# Patient Record
Sex: FEMALE | Race: WHITE | NOT HISPANIC OR LATINO | Employment: OTHER | URBAN - METROPOLITAN AREA
[De-identification: names, ages, dates, MRNs, and addresses within clinical notes are randomized per-mention and may not be internally consistent; named-entity substitution may affect disease eponyms.]

---

## 2019-02-13 ENCOUNTER — HOSPITAL ENCOUNTER (OUTPATIENT)
Dept: FAMILY MEDICINE CLINIC | Facility: CLINIC | Age: 84
Discharge: HOME | End: 2019-02-13
Attending: FAMILY MEDICINE

## 2019-02-15 LAB
AMOXICILLIN+CLAV SUSC ISLT: 16
AMPICILLIN SUSC ISLT: >=32
AMPICILLIN+SULBAC SUSC ISLT: >=32
BACTERIA UR CULT: ABNORMAL
CEFAZOLIN SUSC ISLT: <=4
CEFEPIME SUSC ISLT: <=1
CEFTAZIDIME SUSC ISLT: <=1
CEFTRIAXONE SUSC ISLT: <=1
CEFUROXIME ORAL SUSC ISLT: 16
CEFUROXIME PARENTER SUSC ISLT: 16
CIPROFLOXACIN SUSC ISLT: >=4
ERTAPENEM SUSC ISLT: <=0.5
GENTAMICIN SUSC ISLT: <=1
LEVOFLOXACIN SUSC ISLT: >=8
NITROFURANTOIN SUSC ISLT: <=16
TETRACYCLINE SUSC ISLT: 2
TMP SMX SUSC ISLT: <=20
TOBRAMYCIN SUSC ISLT: <=1

## 2019-03-19 ENCOUNTER — HOSPITAL ENCOUNTER (OUTPATIENT)
Dept: URGENT CARE | Facility: CLINIC | Age: 84
Discharge: HOME | End: 2019-03-19
Attending: NURSE PRACTITIONER

## 2019-03-27 ENCOUNTER — HOSPITAL ENCOUNTER (OUTPATIENT)
Dept: FAMILY MEDICINE CLINIC | Facility: CLINIC | Age: 84
Discharge: HOME | End: 2019-03-27
Attending: FAMILY MEDICINE

## 2019-03-27 LAB
ANION GAP SERPL CALC-SCNC: 20 MMOL/L (ref 8–19)
BUN SERPL-MCNC: 20 MG/DL (ref 5–25)
BUN/CREAT SERPL: 22 {RATIO} (ref 6–20)
CALCIUM SERPL-MCNC: 9.5 MG/DL (ref 8.7–10.4)
CHLORIDE SERPL-SCNC: 99 MMOL/L (ref 99–111)
CONV CO2: 23 MMOL/L (ref 22–32)
CREAT UR-MCNC: 0.91 MG/DL (ref 0.5–0.9)
GFR SERPLBLD BASED ON 1.73 SQ M-ARVRAT: 58 ML/MIN/{1.73_M2}
GLUCOSE SERPL-MCNC: 124 MG/DL (ref 65–99)
OSMOLALITY SERPL CALC.SUM OF ELEC: 290 MOSM/KG (ref 273–304)
POTASSIUM SERPL-SCNC: 4.1 MMOL/L (ref 3.5–5.3)
SODIUM SERPL-SCNC: 138 MMOL/L (ref 135–147)
TSH SERPL-ACNC: 0.3 M[IU]/L (ref 0.27–4.2)

## 2019-04-10 ENCOUNTER — HOSPITAL ENCOUNTER (OUTPATIENT)
Dept: FAMILY MEDICINE CLINIC | Facility: CLINIC | Age: 84
Discharge: HOME | End: 2019-04-10
Attending: FAMILY MEDICINE

## 2019-04-10 LAB
BASOPHILS # BLD AUTO: 0.04 10*3/UL (ref 0–0.2)
BASOPHILS NFR BLD AUTO: 0.6 % (ref 0–3)
CONV ABS IMM GRAN: 0.02 10*3/UL (ref 0–0.2)
CONV IMMATURE GRAN: 0.3 % (ref 0–1.8)
DEPRECATED RDW RBC AUTO: 45.3 FL (ref 36.4–46.3)
EOSINOPHIL # BLD AUTO: 0.12 10*3/UL (ref 0–0.7)
EOSINOPHIL # BLD AUTO: 1.9 % (ref 0–7)
ERYTHROCYTE [DISTWIDTH] IN BLOOD BY AUTOMATED COUNT: 13.4 % (ref 11.7–14.4)
HBA1C MFR BLD: 12.9 G/DL (ref 12–16)
HCT VFR BLD AUTO: 39.9 % (ref 37–47)
LYMPHOCYTES # BLD AUTO: 1.69 10*3/UL (ref 1–5)
MCH RBC QN AUTO: 29.9 PG (ref 27–31)
MCHC RBC AUTO-ENTMCNC: 32.3 G/DL (ref 33–37)
MCV RBC AUTO: 92.4 FL (ref 81–99)
MONOCYTES # BLD AUTO: 0.68 10*3/UL (ref 0.2–1.2)
MONOCYTES NFR BLD AUTO: 10.9 % (ref 3–10)
NEUTROPHILS # BLD AUTO: 3.69 10*3/UL (ref 2–8)
NEUTROPHILS NFR BLD AUTO: 59.2 % (ref 30–85)
NRBC CBCN: 0 % (ref 0–0.7)
PLATELET # BLD AUTO: 198 10*3/UL (ref 130–400)
PMV BLD AUTO: 12 FL (ref 9.4–12.3)
RBC # BLD AUTO: 4.32 10*6/UL (ref 4.2–5.4)
VARIANT LYMPHS NFR BLD MANUAL: 27.1 % (ref 20–45)
WBC # BLD AUTO: 6.24 10*3/UL (ref 4.8–10.8)

## 2020-06-02 LAB — SARS-COV-2 RNA SPEC QL NAA+PROBE: NOT DETECTED

## 2020-06-04 ENCOUNTER — HOSPITAL ENCOUNTER (OUTPATIENT)
Dept: PERIOP | Facility: HOSPITAL | Age: 85
Setting detail: HOSPITAL OUTPATIENT SURGERY
Discharge: HOME | End: 2020-06-04
Attending: OPHTHALMOLOGY

## 2020-06-09 ENCOUNTER — HOSPITAL ENCOUNTER (OUTPATIENT)
Dept: FAMILY MEDICINE CLINIC | Facility: CLINIC | Age: 85
Discharge: HOME | End: 2020-06-09
Attending: FAMILY MEDICINE

## 2020-06-09 LAB
ALBUMIN SERPL-MCNC: 4.1 G/DL (ref 3.5–5)
ALBUMIN/GLOB SERPL: 1.1 {RATIO} (ref 1.4–2.6)
ALP SERPL-CCNC: 119 U/L (ref 43–160)
ALT SERPL-CCNC: 37 U/L (ref 10–40)
ANION GAP SERPL CALC-SCNC: 18 MMOL/L (ref 8–19)
AST SERPL-CCNC: 36 U/L (ref 15–50)
BILIRUB SERPL-MCNC: 0.39 MG/DL (ref 0.2–1.3)
BUN SERPL-MCNC: 19 MG/DL (ref 5–25)
BUN/CREAT SERPL: 18 {RATIO} (ref 6–20)
CALCIUM SERPL-MCNC: 9.6 MG/DL (ref 8.7–10.4)
CHLORIDE SERPL-SCNC: 103 MMOL/L (ref 99–111)
CHOLEST SERPL-MCNC: 158 MG/DL (ref 107–200)
CHOLEST/HDLC SERPL: 4.1 {RATIO} (ref 3–6)
CONV CO2: 21 MMOL/L (ref 22–32)
CONV TOTAL PROTEIN: 7.7 G/DL (ref 6.3–8.2)
CREAT UR-MCNC: 1.06 MG/DL (ref 0.5–0.9)
GFR SERPLBLD BASED ON 1.73 SQ M-ARVRAT: 48 ML/MIN/{1.73_M2}
GLOBULIN UR ELPH-MCNC: 3.6 G/DL (ref 2–3.5)
GLUCOSE SERPL-MCNC: 94 MG/DL (ref 65–99)
HDLC SERPL-MCNC: 39 MG/DL (ref 40–60)
LDLC SERPL CALC-MCNC: 101 MG/DL (ref 70–100)
OSMOLALITY SERPL CALC.SUM OF ELEC: 288 MOSM/KG (ref 273–304)
POTASSIUM SERPL-SCNC: 4.4 MMOL/L (ref 3.5–5.3)
SODIUM SERPL-SCNC: 138 MMOL/L (ref 135–147)
TRIGL SERPL-MCNC: 92 MG/DL (ref 40–150)
TSH SERPL-ACNC: 2.3 M[IU]/L (ref 0.27–4.2)
VLDLC SERPL-MCNC: 18 MG/DL (ref 5–37)

## 2022-07-26 ENCOUNTER — TELEPHONE (OUTPATIENT)
Dept: ORTHOPEDIC SURGERY | Facility: CLINIC | Age: 87
End: 2022-07-26

## 2022-11-18 ENCOUNTER — TRANSCRIBE ORDERS (OUTPATIENT)
Dept: LAB | Facility: HOSPITAL | Age: 87
End: 2022-11-18

## 2022-11-18 ENCOUNTER — LAB (OUTPATIENT)
Dept: LAB | Facility: HOSPITAL | Age: 87
End: 2022-11-18

## 2022-11-18 DIAGNOSIS — B17.9 ACUTE VIRAL HEPATITIS, UNSPECIFIED VIRAL HEPATITIS TYPE: ICD-10-CM

## 2022-11-18 DIAGNOSIS — Z79.899 ENCOUNTER FOR LONG-TERM (CURRENT) USE OF OTHER MEDICATIONS: ICD-10-CM

## 2022-11-18 DIAGNOSIS — L30.9 ACUTE DERMATITIS: ICD-10-CM

## 2022-11-18 DIAGNOSIS — B17.9 ACUTE VIRAL HEPATITIS, UNSPECIFIED VIRAL HEPATITIS TYPE: Primary | ICD-10-CM

## 2022-11-18 LAB
BASOPHILS # BLD AUTO: 0.04 10*3/MM3 (ref 0–0.2)
BASOPHILS NFR BLD AUTO: 0.5 % (ref 0–1.5)
DEPRECATED RDW RBC AUTO: 41.3 FL (ref 37–54)
EOSINOPHIL # BLD AUTO: 0.17 10*3/MM3 (ref 0–0.4)
EOSINOPHIL NFR BLD AUTO: 2.1 % (ref 0.3–6.2)
ERYTHROCYTE [DISTWIDTH] IN BLOOD BY AUTOMATED COUNT: 12.9 % (ref 12.3–15.4)
HAV IGM SERPL QL IA: NORMAL
HBV CORE IGM SERPL QL IA: NORMAL
HBV SURFACE AG SERPL QL IA: NORMAL
HCT VFR BLD AUTO: 37 % (ref 34–46.6)
HCV AB SER DONR QL: NORMAL
HGB BLD-MCNC: 12.2 G/DL (ref 12–15.9)
IMM GRANULOCYTES # BLD AUTO: 0.03 10*3/MM3 (ref 0–0.05)
IMM GRANULOCYTES NFR BLD AUTO: 0.4 % (ref 0–0.5)
LYMPHOCYTES # BLD AUTO: 1.08 10*3/MM3 (ref 0.7–3.1)
LYMPHOCYTES NFR BLD AUTO: 13.2 % (ref 19.6–45.3)
MCH RBC QN AUTO: 29 PG (ref 26.6–33)
MCHC RBC AUTO-ENTMCNC: 33 G/DL (ref 31.5–35.7)
MCV RBC AUTO: 88.1 FL (ref 79–97)
MONOCYTES # BLD AUTO: 1 10*3/MM3 (ref 0.1–0.9)
MONOCYTES NFR BLD AUTO: 12.2 % (ref 5–12)
NEUTROPHILS NFR BLD AUTO: 5.86 10*3/MM3 (ref 1.7–7)
NEUTROPHILS NFR BLD AUTO: 71.6 % (ref 42.7–76)
NRBC BLD AUTO-RTO: 0 /100 WBC (ref 0–0.2)
PLATELET # BLD AUTO: 265 10*3/MM3 (ref 140–450)
PMV BLD AUTO: 11.3 FL (ref 6–12)
RBC # BLD AUTO: 4.2 10*6/MM3 (ref 3.77–5.28)
WBC NRBC COR # BLD: 8.18 10*3/MM3 (ref 3.4–10.8)

## 2022-11-18 PROCEDURE — 85025 COMPLETE CBC W/AUTO DIFF WBC: CPT

## 2022-11-18 PROCEDURE — 36415 COLL VENOUS BLD VENIPUNCTURE: CPT

## 2022-11-18 PROCEDURE — 80053 COMPREHEN METABOLIC PANEL: CPT

## 2022-11-18 PROCEDURE — 80074 ACUTE HEPATITIS PANEL: CPT

## 2022-11-19 LAB
ALBUMIN SERPL-MCNC: 3.6 G/DL (ref 3.5–5.2)
ALBUMIN/GLOB SERPL: 1.1 G/DL
ALP SERPL-CCNC: 97 U/L (ref 39–117)
ALT SERPL W P-5'-P-CCNC: 11 U/L (ref 1–33)
ANION GAP SERPL CALCULATED.3IONS-SCNC: 12.3 MMOL/L (ref 5–15)
AST SERPL-CCNC: 21 U/L (ref 1–32)
BILIRUB SERPL-MCNC: 0.3 MG/DL (ref 0–1.2)
BUN SERPL-MCNC: 17 MG/DL (ref 8–23)
BUN/CREAT SERPL: 19.1 (ref 7–25)
CALCIUM SPEC-SCNC: 9.2 MG/DL (ref 8.6–10.5)
CHLORIDE SERPL-SCNC: 99 MMOL/L (ref 98–107)
CO2 SERPL-SCNC: 24.7 MMOL/L (ref 22–29)
CREAT SERPL-MCNC: 0.89 MG/DL (ref 0.57–1)
EGFRCR SERPLBLD CKD-EPI 2021: 62.8 ML/MIN/1.73
GLOBULIN UR ELPH-MCNC: 3.4 GM/DL
GLUCOSE SERPL-MCNC: 90 MG/DL (ref 65–99)
POTASSIUM SERPL-SCNC: 4.3 MMOL/L (ref 3.5–5.2)
PROT SERPL-MCNC: 7 G/DL (ref 6–8.5)
SODIUM SERPL-SCNC: 136 MMOL/L (ref 136–145)

## 2023-04-19 ENCOUNTER — OFFICE VISIT (OUTPATIENT)
Dept: FAMILY MEDICINE CLINIC | Facility: CLINIC | Age: 88
End: 2023-04-19
Payer: MEDICARE

## 2023-04-19 VITALS
SYSTOLIC BLOOD PRESSURE: 110 MMHG | OXYGEN SATURATION: 94 % | BODY MASS INDEX: 31.06 KG/M2 | HEIGHT: 62 IN | HEART RATE: 78 BPM | WEIGHT: 168.8 LBS | DIASTOLIC BLOOD PRESSURE: 64 MMHG

## 2023-04-19 DIAGNOSIS — I48.21 PERMANENT ATRIAL FIBRILLATION: Primary | ICD-10-CM

## 2023-04-19 DIAGNOSIS — E03.9 HYPOTHYROIDISM, UNSPECIFIED TYPE: ICD-10-CM

## 2023-04-19 DIAGNOSIS — L57.0 ACTINIC KERATOSIS: ICD-10-CM

## 2023-04-19 DIAGNOSIS — Z95.0 PACEMAKER: ICD-10-CM

## 2023-04-19 RX ORDER — RIVAROXABAN 15 MG/1
1 TABLET, FILM COATED ORAL DAILY
COMMUNITY
Start: 2023-01-03

## 2023-04-19 RX ORDER — CARVEDILOL 12.5 MG/1
1 TABLET ORAL EVERY 12 HOURS SCHEDULED
COMMUNITY
Start: 2023-04-05

## 2023-04-19 RX ORDER — DILTIAZEM HYDROCHLORIDE 180 MG/1
1 CAPSULE, COATED, EXTENDED RELEASE ORAL DAILY
COMMUNITY
Start: 2023-04-05

## 2023-04-19 RX ORDER — KETOCONAZOLE 20 MG/G
CREAM TOPICAL
COMMUNITY
Start: 2023-01-12

## 2023-04-19 RX ORDER — TRIAMCINOLONE ACETONIDE 1 MG/G
CREAM TOPICAL
COMMUNITY
Start: 2023-01-03

## 2023-04-19 RX ORDER — LEVOTHYROXINE SODIUM 175 UG/1
1 TABLET ORAL DAILY
COMMUNITY
Start: 2023-04-05

## 2023-07-09 PROBLEM — I50.9 CHF (CONGESTIVE HEART FAILURE): Status: ACTIVE | Noted: 2023-07-09

## 2023-08-11 ENCOUNTER — TELEPHONE (OUTPATIENT)
Dept: FAMILY MEDICINE CLINIC | Facility: CLINIC | Age: 88
End: 2023-08-11
Payer: MEDICARE

## 2023-08-18 ENCOUNTER — TELEPHONE (OUTPATIENT)
Dept: FAMILY MEDICINE CLINIC | Facility: CLINIC | Age: 88
End: 2023-08-18

## 2023-08-21 RX ORDER — LEVOTHYROXINE SODIUM 175 UG/1
175 TABLET ORAL DAILY
Qty: 30 TABLET | Refills: 1 | Status: SHIPPED | OUTPATIENT
Start: 2023-08-21 | End: 2023-08-23 | Stop reason: SDUPTHER

## 2023-08-23 ENCOUNTER — OFFICE VISIT (OUTPATIENT)
Dept: FAMILY MEDICINE CLINIC | Facility: CLINIC | Age: 88
End: 2023-08-23
Payer: MEDICARE

## 2023-08-23 VITALS
SYSTOLIC BLOOD PRESSURE: 117 MMHG | WEIGHT: 158.3 LBS | HEIGHT: 62 IN | OXYGEN SATURATION: 96 % | TEMPERATURE: 98 F | DIASTOLIC BLOOD PRESSURE: 62 MMHG | HEART RATE: 86 BPM | BODY MASS INDEX: 29.13 KG/M2

## 2023-08-23 DIAGNOSIS — E03.9 HYPOTHYROIDISM, UNSPECIFIED TYPE: Primary | ICD-10-CM

## 2023-08-23 DIAGNOSIS — R35.0 URINARY FREQUENCY: ICD-10-CM

## 2023-08-23 LAB
BILIRUB BLD-MCNC: NEGATIVE MG/DL
CLARITY, POC: CLEAR
COLOR UR: YELLOW
EXPIRATION DATE: ABNORMAL
GLUCOSE UR STRIP-MCNC: NEGATIVE MG/DL
KETONES UR QL: NEGATIVE
LEUKOCYTE EST, POC: ABNORMAL
Lab: ABNORMAL
NITRITE UR-MCNC: NEGATIVE MG/ML
PH UR: 5.5 [PH] (ref 5–8)
PROT UR STRIP-MCNC: NEGATIVE MG/DL
RBC # UR STRIP: NEGATIVE /UL
SP GR UR: 1.01 (ref 1–1.03)
UROBILINOGEN UR QL: NORMAL

## 2023-08-23 PROCEDURE — 87086 URINE CULTURE/COLONY COUNT: CPT | Performed by: NURSE PRACTITIONER

## 2023-08-23 RX ORDER — LEVOTHYROXINE SODIUM 175 UG/1
175 TABLET ORAL DAILY
Qty: 30 TABLET | Refills: 1 | Status: SHIPPED | OUTPATIENT
Start: 2023-08-23

## 2023-08-25 ENCOUNTER — TELEPHONE (OUTPATIENT)
Dept: FAMILY MEDICINE CLINIC | Facility: CLINIC | Age: 88
End: 2023-08-25
Payer: MEDICARE

## 2023-08-25 LAB — BACTERIA SPEC AEROBE CULT: ABNORMAL

## 2023-08-25 RX ORDER — CIPROFLOXACIN 500 MG/1
500 TABLET, FILM COATED ORAL 2 TIMES DAILY
Qty: 14 TABLET | Refills: 0 | Status: SHIPPED | OUTPATIENT
Start: 2023-08-25 | End: 2023-09-01

## 2023-10-12 ENCOUNTER — TELEPHONE (OUTPATIENT)
Dept: FAMILY MEDICINE CLINIC | Facility: CLINIC | Age: 88
End: 2023-10-12
Payer: MEDICARE

## 2023-10-13 DIAGNOSIS — R60.1 GENERALIZED EDEMA: ICD-10-CM

## 2023-10-13 RX ORDER — POTASSIUM CHLORIDE 750 MG/1
10 TABLET, EXTENDED RELEASE ORAL 2 TIMES DAILY
Qty: 180 TABLET | Refills: 1 | OUTPATIENT
Start: 2023-10-13

## 2023-11-16 RX ORDER — RIVAROXABAN 15 MG/1
15 TABLET, FILM COATED ORAL DAILY
Qty: 90 TABLET | Refills: 1 | Status: SHIPPED | OUTPATIENT
Start: 2023-11-16

## 2024-01-05 DIAGNOSIS — E03.9 HYPOTHYROIDISM, UNSPECIFIED TYPE: ICD-10-CM

## 2024-01-05 RX ORDER — CARVEDILOL 12.5 MG/1
12.5 TABLET ORAL EVERY 12 HOURS SCHEDULED
Qty: 30 TABLET | Refills: 1 | Status: SHIPPED | OUTPATIENT
Start: 2024-01-05

## 2024-01-05 RX ORDER — DILTIAZEM HYDROCHLORIDE 180 MG/1
180 CAPSULE, COATED, EXTENDED RELEASE ORAL DAILY
Qty: 30 CAPSULE | Refills: 1 | Status: SHIPPED | OUTPATIENT
Start: 2024-01-05

## 2024-01-05 RX ORDER — LEVOTHYROXINE SODIUM 175 UG/1
175 TABLET ORAL DAILY
Qty: 30 TABLET | Refills: 1 | Status: SHIPPED | OUTPATIENT
Start: 2024-01-05

## 2024-02-20 ENCOUNTER — OFFICE VISIT (OUTPATIENT)
Dept: FAMILY MEDICINE CLINIC | Facility: CLINIC | Age: 89
End: 2024-02-20
Payer: MEDICARE

## 2024-02-20 VITALS
WEIGHT: 158.4 LBS | DIASTOLIC BLOOD PRESSURE: 62 MMHG | OXYGEN SATURATION: 96 % | HEART RATE: 76 BPM | HEIGHT: 62 IN | TEMPERATURE: 97.8 F | SYSTOLIC BLOOD PRESSURE: 111 MMHG | BODY MASS INDEX: 29.15 KG/M2

## 2024-02-20 DIAGNOSIS — R29.898 WEAKNESS OF BOTH HANDS: ICD-10-CM

## 2024-02-20 DIAGNOSIS — Z13.220 LIPID SCREENING: ICD-10-CM

## 2024-02-20 DIAGNOSIS — E03.9 HYPOTHYROIDISM, UNSPECIFIED TYPE: Primary | ICD-10-CM

## 2024-02-20 DIAGNOSIS — R39.15 URINARY URGENCY: ICD-10-CM

## 2024-02-20 DIAGNOSIS — R35.0 URINARY FREQUENCY: ICD-10-CM

## 2024-02-20 DIAGNOSIS — R29.898 DECREASED GRIP STRENGTH OF LEFT HAND: ICD-10-CM

## 2024-02-20 DIAGNOSIS — R29.898 DECREASED GRIP STRENGTH OF RIGHT HAND: ICD-10-CM

## 2024-02-20 DIAGNOSIS — I48.21 PERMANENT ATRIAL FIBRILLATION: ICD-10-CM

## 2024-02-20 LAB
ALBUMIN SERPL-MCNC: 4 G/DL (ref 3.5–5.2)
ALBUMIN/GLOB SERPL: 1.3 G/DL
ALP SERPL-CCNC: 139 U/L (ref 39–117)
ALT SERPL W P-5'-P-CCNC: 11 U/L (ref 1–33)
ANION GAP SERPL CALCULATED.3IONS-SCNC: 15.1 MMOL/L (ref 5–15)
AST SERPL-CCNC: 20 U/L (ref 1–32)
BACTERIA UR QL AUTO: ABNORMAL /HPF
BILIRUB SERPL-MCNC: 0.4 MG/DL (ref 0–1.2)
BILIRUB UR QL STRIP: NEGATIVE
BUN SERPL-MCNC: 23 MG/DL (ref 8–23)
BUN/CREAT SERPL: 19.5 (ref 7–25)
CALCIUM SPEC-SCNC: 9.7 MG/DL (ref 8.6–10.5)
CHLORIDE SERPL-SCNC: 101 MMOL/L (ref 98–107)
CHOLEST SERPL-MCNC: 196 MG/DL (ref 0–200)
CLARITY UR: ABNORMAL
CO2 SERPL-SCNC: 21.9 MMOL/L (ref 22–29)
COLOR UR: YELLOW
CREAT SERPL-MCNC: 1.18 MG/DL (ref 0.57–1)
DEPRECATED RDW RBC AUTO: 46.3 FL (ref 37–54)
EGFRCR SERPLBLD CKD-EPI 2021: 44.2 ML/MIN/1.73
ERYTHROCYTE [DISTWIDTH] IN BLOOD BY AUTOMATED COUNT: 15.8 % (ref 12.3–15.4)
GLOBULIN UR ELPH-MCNC: 3.2 GM/DL
GLUCOSE SERPL-MCNC: 98 MG/DL (ref 65–99)
GLUCOSE UR STRIP-MCNC: NEGATIVE MG/DL
HCT VFR BLD AUTO: 38.8 % (ref 34–46.6)
HDLC SERPL-MCNC: 40 MG/DL (ref 40–60)
HGB BLD-MCNC: 12.5 G/DL (ref 12–15.9)
HGB UR QL STRIP.AUTO: NEGATIVE
HOLD SPECIMEN: NORMAL
HYALINE CASTS UR QL AUTO: ABNORMAL /LPF
KETONES UR QL STRIP: NEGATIVE
LDLC SERPL CALC-MCNC: 130 MG/DL (ref 0–100)
LDLC/HDLC SERPL: 3.19 {RATIO}
LEUKOCYTE ESTERASE UR QL STRIP.AUTO: ABNORMAL
MCH RBC QN AUTO: 26.2 PG (ref 26.6–33)
MCHC RBC AUTO-ENTMCNC: 32.2 G/DL (ref 31.5–35.7)
MCV RBC AUTO: 81.2 FL (ref 79–97)
NITRITE UR QL STRIP: NEGATIVE
PH UR STRIP.AUTO: 5.5 [PH] (ref 5–8)
PLATELET # BLD AUTO: 223 10*3/MM3 (ref 140–450)
PMV BLD AUTO: 11.7 FL (ref 6–12)
POTASSIUM SERPL-SCNC: 4.2 MMOL/L (ref 3.5–5.2)
PROT SERPL-MCNC: 7.2 G/DL (ref 6–8.5)
PROT UR QL STRIP: NEGATIVE
RBC # BLD AUTO: 4.78 10*6/MM3 (ref 3.77–5.28)
RBC # UR STRIP: ABNORMAL /HPF
REF LAB TEST METHOD: ABNORMAL
SODIUM SERPL-SCNC: 138 MMOL/L (ref 136–145)
SP GR UR STRIP: 1.02 (ref 1–1.03)
SQUAMOUS #/AREA URNS HPF: ABNORMAL /HPF
TRIGL SERPL-MCNC: 143 MG/DL (ref 0–150)
TSH SERPL DL<=0.05 MIU/L-ACNC: 0.6 UIU/ML (ref 0.27–4.2)
UROBILINOGEN UR QL STRIP: ABNORMAL
VLDLC SERPL-MCNC: 26 MG/DL (ref 5–40)
WBC # UR STRIP: ABNORMAL /HPF
WBC NRBC COR # BLD AUTO: 7.52 10*3/MM3 (ref 3.4–10.8)

## 2024-02-20 PROCEDURE — 87077 CULTURE AEROBIC IDENTIFY: CPT | Performed by: NURSE PRACTITIONER

## 2024-02-20 PROCEDURE — 80053 COMPREHEN METABOLIC PANEL: CPT | Performed by: NURSE PRACTITIONER

## 2024-02-20 PROCEDURE — 99214 OFFICE O/P EST MOD 30 MIN: CPT | Performed by: NURSE PRACTITIONER

## 2024-02-20 PROCEDURE — 81001 URINALYSIS AUTO W/SCOPE: CPT | Performed by: NURSE PRACTITIONER

## 2024-02-20 PROCEDURE — 87186 SC STD MICRODIL/AGAR DIL: CPT | Performed by: NURSE PRACTITIONER

## 2024-02-20 PROCEDURE — 80061 LIPID PANEL: CPT | Performed by: NURSE PRACTITIONER

## 2024-02-20 PROCEDURE — 85027 COMPLETE CBC AUTOMATED: CPT | Performed by: NURSE PRACTITIONER

## 2024-02-20 PROCEDURE — 87086 URINE CULTURE/COLONY COUNT: CPT | Performed by: NURSE PRACTITIONER

## 2024-02-20 PROCEDURE — 84443 ASSAY THYROID STIM HORMONE: CPT | Performed by: NURSE PRACTITIONER

## 2024-02-20 PROCEDURE — 1159F MED LIST DOCD IN RCRD: CPT | Performed by: NURSE PRACTITIONER

## 2024-02-20 PROCEDURE — 1160F RVW MEDS BY RX/DR IN RCRD: CPT | Performed by: NURSE PRACTITIONER

## 2024-02-20 PROCEDURE — 36415 COLL VENOUS BLD VENIPUNCTURE: CPT | Performed by: NURSE PRACTITIONER

## 2024-02-20 RX ORDER — LEVOTHYROXINE SODIUM 175 UG/1
175 TABLET ORAL DAILY
Qty: 30 TABLET | Refills: 1 | Status: SHIPPED | OUTPATIENT
Start: 2024-02-20

## 2024-02-20 RX ORDER — DILTIAZEM HYDROCHLORIDE 180 MG/1
180 CAPSULE, COATED, EXTENDED RELEASE ORAL DAILY
Qty: 30 CAPSULE | Refills: 1 | Status: SHIPPED | OUTPATIENT
Start: 2024-02-20

## 2024-02-20 RX ORDER — CARVEDILOL 12.5 MG/1
12.5 TABLET ORAL EVERY 12 HOURS SCHEDULED
Qty: 30 TABLET | Refills: 1 | Status: SHIPPED | OUTPATIENT
Start: 2024-02-20

## 2024-02-22 LAB — BACTERIA SPEC AEROBE CULT: ABNORMAL

## 2024-02-23 RX ORDER — CEPHALEXIN 500 MG/1
500 CAPSULE ORAL 2 TIMES DAILY
Qty: 20 CAPSULE | Refills: 0 | Status: SHIPPED | OUTPATIENT
Start: 2024-02-23

## 2024-03-07 ENCOUNTER — TREATMENT (OUTPATIENT)
Dept: PHYSICAL THERAPY | Facility: CLINIC | Age: 89
End: 2024-03-07
Payer: MEDICARE

## 2024-03-07 DIAGNOSIS — R29.898 WEAKNESS OF BOTH HANDS: Primary | ICD-10-CM

## 2024-03-07 PROCEDURE — 97166 OT EVAL MOD COMPLEX 45 MIN: CPT | Performed by: PHYSICAL THERAPIST

## 2024-03-07 PROCEDURE — 97110 THERAPEUTIC EXERCISES: CPT | Performed by: PHYSICAL THERAPIST

## 2024-03-12 ENCOUNTER — TREATMENT (OUTPATIENT)
Dept: PHYSICAL THERAPY | Facility: CLINIC | Age: 89
End: 2024-03-12
Payer: MEDICARE

## 2024-03-12 DIAGNOSIS — R29.898 WEAKNESS OF BOTH HANDS: Primary | ICD-10-CM

## 2024-03-12 PROCEDURE — 97112 NEUROMUSCULAR REEDUCATION: CPT | Performed by: PHYSICAL THERAPIST

## 2024-03-12 PROCEDURE — 97110 THERAPEUTIC EXERCISES: CPT | Performed by: PHYSICAL THERAPIST

## 2024-03-15 ENCOUNTER — TREATMENT (OUTPATIENT)
Dept: PHYSICAL THERAPY | Facility: CLINIC | Age: 89
End: 2024-03-15
Payer: MEDICARE

## 2024-03-15 DIAGNOSIS — R29.898 WEAKNESS OF BOTH HANDS: Primary | ICD-10-CM

## 2024-03-15 PROCEDURE — 97112 NEUROMUSCULAR REEDUCATION: CPT | Performed by: PHYSICAL THERAPIST

## 2024-03-15 PROCEDURE — 97110 THERAPEUTIC EXERCISES: CPT | Performed by: PHYSICAL THERAPIST

## 2024-03-19 ENCOUNTER — TREATMENT (OUTPATIENT)
Dept: PHYSICAL THERAPY | Facility: CLINIC | Age: 89
End: 2024-03-19
Payer: MEDICARE

## 2024-03-19 DIAGNOSIS — R29.898 WEAKNESS OF BOTH HANDS: Primary | ICD-10-CM

## 2024-03-19 PROCEDURE — 97112 NEUROMUSCULAR REEDUCATION: CPT | Performed by: PHYSICAL THERAPIST

## 2024-03-19 PROCEDURE — 97110 THERAPEUTIC EXERCISES: CPT | Performed by: PHYSICAL THERAPIST

## 2024-03-21 ENCOUNTER — TELEPHONE (OUTPATIENT)
Dept: FAMILY MEDICINE CLINIC | Facility: CLINIC | Age: 89
End: 2024-03-21
Payer: MEDICARE

## 2024-03-21 ENCOUNTER — TREATMENT (OUTPATIENT)
Dept: PHYSICAL THERAPY | Facility: CLINIC | Age: 89
End: 2024-03-21
Payer: MEDICARE

## 2024-03-21 DIAGNOSIS — R29.898 WEAKNESS OF BOTH HANDS: Primary | ICD-10-CM

## 2024-03-21 PROCEDURE — 97112 NEUROMUSCULAR REEDUCATION: CPT | Performed by: PHYSICAL THERAPIST

## 2024-03-21 PROCEDURE — 97110 THERAPEUTIC EXERCISES: CPT | Performed by: PHYSICAL THERAPIST

## 2024-03-22 RX ORDER — FLUCONAZOLE 150 MG/1
TABLET ORAL
Qty: 2 TABLET | Refills: 1 | Status: SHIPPED | OUTPATIENT
Start: 2024-03-22

## 2024-03-22 RX ORDER — FLUCONAZOLE 150 MG/1
TABLET ORAL
Qty: 2 TABLET | Refills: 1 | Status: SHIPPED | OUTPATIENT
Start: 2024-03-22 | End: 2024-03-22 | Stop reason: SDUPTHER

## 2024-04-12 ENCOUNTER — CLINICAL SUPPORT (OUTPATIENT)
Dept: FAMILY MEDICINE CLINIC | Facility: CLINIC | Age: 89
End: 2024-04-12
Payer: MEDICARE

## 2024-04-12 DIAGNOSIS — R35.0 URINARY FREQUENCY: ICD-10-CM

## 2024-04-12 DIAGNOSIS — N89.8 VAGINAL ITCHING: Primary | ICD-10-CM

## 2024-04-12 DIAGNOSIS — R39.15 URINARY URGENCY: ICD-10-CM

## 2024-04-12 LAB
CANDIDA SPECIES: NEGATIVE
GARDNERELLA VAGINALIS: NEGATIVE
T VAGINALIS DNA VAG QL PROBE+SIG AMP: NEGATIVE

## 2024-04-12 PROCEDURE — 87186 SC STD MICRODIL/AGAR DIL: CPT | Performed by: NURSE PRACTITIONER

## 2024-04-12 PROCEDURE — 87510 GARDNER VAG DNA DIR PROBE: CPT | Performed by: NURSE PRACTITIONER

## 2024-04-12 PROCEDURE — 87077 CULTURE AEROBIC IDENTIFY: CPT | Performed by: NURSE PRACTITIONER

## 2024-04-12 PROCEDURE — 87086 URINE CULTURE/COLONY COUNT: CPT | Performed by: NURSE PRACTITIONER

## 2024-04-12 PROCEDURE — 87480 CANDIDA DNA DIR PROBE: CPT | Performed by: NURSE PRACTITIONER

## 2024-04-12 PROCEDURE — 87660 TRICHOMONAS VAGIN DIR PROBE: CPT | Performed by: NURSE PRACTITIONER

## 2024-04-14 LAB — BACTERIA SPEC AEROBE CULT: ABNORMAL

## 2024-04-15 ENCOUNTER — TELEPHONE (OUTPATIENT)
Dept: FAMILY MEDICINE CLINIC | Facility: CLINIC | Age: 89
End: 2024-04-15
Payer: MEDICARE

## 2024-04-15 RX ORDER — CEPHALEXIN 500 MG/1
500 CAPSULE ORAL 3 TIMES DAILY
Qty: 21 CAPSULE | Refills: 0 | Status: SHIPPED | OUTPATIENT
Start: 2024-04-15

## 2024-04-22 ENCOUNTER — DOCUMENTATION (OUTPATIENT)
Dept: PHYSICAL THERAPY | Facility: CLINIC | Age: 89
End: 2024-04-22
Payer: MEDICARE

## 2024-05-08 ENCOUNTER — TELEPHONE (OUTPATIENT)
Dept: FAMILY MEDICINE CLINIC | Facility: CLINIC | Age: 89
End: 2024-05-08
Payer: MEDICARE

## 2024-05-09 DIAGNOSIS — R39.15 URINARY URGENCY: ICD-10-CM

## 2024-05-09 DIAGNOSIS — R35.0 URINARY FREQUENCY: ICD-10-CM

## 2024-05-09 PROCEDURE — 87186 SC STD MICRODIL/AGAR DIL: CPT | Performed by: NURSE PRACTITIONER

## 2024-05-09 PROCEDURE — 87077 CULTURE AEROBIC IDENTIFY: CPT | Performed by: NURSE PRACTITIONER

## 2024-05-09 PROCEDURE — 87086 URINE CULTURE/COLONY COUNT: CPT | Performed by: NURSE PRACTITIONER

## 2024-05-11 LAB — BACTERIA SPEC AEROBE CULT: ABNORMAL

## 2024-05-13 RX ORDER — CEPHALEXIN 500 MG/1
500 CAPSULE ORAL 3 TIMES DAILY
Qty: 21 CAPSULE | Refills: 0 | Status: SHIPPED | OUTPATIENT
Start: 2024-05-13

## 2024-06-10 ENCOUNTER — CLINICAL SUPPORT (OUTPATIENT)
Dept: FAMILY MEDICINE CLINIC | Facility: CLINIC | Age: 89
End: 2024-06-10
Payer: MEDICARE

## 2024-06-10 DIAGNOSIS — N39.0 RECURRENT UTI: Primary | ICD-10-CM

## 2024-06-10 DIAGNOSIS — R39.15 URINARY URGENCY: Primary | ICD-10-CM

## 2024-06-10 LAB
BILIRUB BLD-MCNC: NEGATIVE MG/DL
CLARITY, POC: CLEAR
COLOR UR: YELLOW
EXPIRATION DATE: ABNORMAL
GLUCOSE UR STRIP-MCNC: NEGATIVE MG/DL
KETONES UR QL: ABNORMAL
LEUKOCYTE EST, POC: ABNORMAL
Lab: ABNORMAL
NITRITE UR-MCNC: NEGATIVE MG/ML
PH UR: 5.5 [PH] (ref 5–8)
PROT UR STRIP-MCNC: ABNORMAL MG/DL
RBC # UR STRIP: ABNORMAL /UL
SP GR UR: 1.01 (ref 1–1.03)
UROBILINOGEN UR QL: ABNORMAL

## 2024-06-10 PROCEDURE — 81003 URINALYSIS AUTO W/O SCOPE: CPT | Performed by: NURSE PRACTITIONER

## 2024-06-10 PROCEDURE — 87086 URINE CULTURE/COLONY COUNT: CPT | Performed by: NURSE PRACTITIONER

## 2024-06-10 PROCEDURE — 87077 CULTURE AEROBIC IDENTIFY: CPT | Performed by: NURSE PRACTITIONER

## 2024-06-10 PROCEDURE — 87186 SC STD MICRODIL/AGAR DIL: CPT | Performed by: NURSE PRACTITIONER

## 2024-06-10 RX ORDER — AZITHROMYCIN 250 MG/1
TABLET, FILM COATED ORAL
Qty: 6 TABLET | Refills: 0 | Status: SHIPPED | OUTPATIENT
Start: 2024-06-10

## 2024-06-10 RX ORDER — CEFDINIR 300 MG/1
300 CAPSULE ORAL 2 TIMES DAILY
Qty: 20 CAPSULE | Refills: 0 | Status: SHIPPED | OUTPATIENT
Start: 2024-06-10 | End: 2024-06-20

## 2024-06-12 LAB — BACTERIA SPEC AEROBE CULT: ABNORMAL

## 2024-06-18 ENCOUNTER — TELEPHONE (OUTPATIENT)
Dept: FAMILY MEDICINE CLINIC | Facility: CLINIC | Age: 89
End: 2024-06-18
Payer: MEDICARE

## 2024-06-19 DIAGNOSIS — N39.0 RECURRENT UTI: Primary | ICD-10-CM

## 2024-07-10 ENCOUNTER — CLINICAL SUPPORT (OUTPATIENT)
Dept: FAMILY MEDICINE CLINIC | Facility: CLINIC | Age: 89
End: 2024-07-10
Payer: MEDICARE

## 2024-07-10 DIAGNOSIS — N39.0 RECURRENT UTI: ICD-10-CM

## 2024-07-10 PROCEDURE — 87086 URINE CULTURE/COLONY COUNT: CPT | Performed by: NURSE PRACTITIONER

## 2024-07-10 PROCEDURE — 87077 CULTURE AEROBIC IDENTIFY: CPT | Performed by: NURSE PRACTITIONER

## 2024-07-10 PROCEDURE — 87186 SC STD MICRODIL/AGAR DIL: CPT | Performed by: NURSE PRACTITIONER

## 2024-07-12 LAB — BACTERIA SPEC AEROBE CULT: ABNORMAL

## 2024-07-15 RX ORDER — LEVOFLOXACIN 500 MG/1
500 TABLET, FILM COATED ORAL DAILY
Qty: 7 TABLET | Refills: 0 | Status: SHIPPED | OUTPATIENT
Start: 2024-07-15

## 2024-08-05 ENCOUNTER — OFFICE VISIT (OUTPATIENT)
Dept: UROLOGY | Facility: CLINIC | Age: 89
End: 2024-08-05
Payer: MEDICARE

## 2024-08-05 VITALS
RESPIRATION RATE: 14 BRPM | HEART RATE: 87 BPM | HEIGHT: 62 IN | WEIGHT: 163.6 LBS | BODY MASS INDEX: 30.11 KG/M2 | DIASTOLIC BLOOD PRESSURE: 72 MMHG | SYSTOLIC BLOOD PRESSURE: 119 MMHG

## 2024-08-05 DIAGNOSIS — R39.15 URGENCY OF URINATION: Primary | ICD-10-CM

## 2024-08-05 DIAGNOSIS — R82.71 ASYMPTOMATIC BACTERIURIA: ICD-10-CM

## 2024-08-05 DIAGNOSIS — N39.41 URGE INCONTINENCE: ICD-10-CM

## 2024-08-05 DIAGNOSIS — N95.8 GENITOURINARY SYNDROME OF MENOPAUSE: ICD-10-CM

## 2024-08-05 PROBLEM — I34.0 MITRAL REGURGITATION: Status: ACTIVE | Noted: 2024-08-05

## 2024-08-05 PROBLEM — E78.5 HYPERLIPIDEMIA: Status: ACTIVE | Noted: 2024-08-05

## 2024-08-05 PROBLEM — J45.909 ASTHMA: Status: ACTIVE | Noted: 2024-08-05

## 2024-08-05 PROBLEM — M54.9 BACK PAIN: Status: ACTIVE | Noted: 2024-08-05

## 2024-08-05 PROBLEM — I10 HYPERTENSION: Status: ACTIVE | Noted: 2024-08-05

## 2024-08-05 LAB
BILIRUB BLD-MCNC: NEGATIVE MG/DL
CLARITY, POC: CLEAR
COLOR UR: YELLOW
EXPIRATION DATE: NORMAL
GLUCOSE UR STRIP-MCNC: NEGATIVE MG/DL
KETONES UR QL: NEGATIVE
LEUKOCYTE EST, POC: NEGATIVE
Lab: NORMAL
NITRITE UR-MCNC: NEGATIVE MG/ML
PH UR: 5.5 [PH] (ref 5–8)
PROT UR STRIP-MCNC: NEGATIVE MG/DL
RBC # UR STRIP: NEGATIVE /UL
SP GR UR: 1.01 (ref 1–1.03)
URINE VOLUME: 0
UROBILINOGEN UR QL: NORMAL

## 2024-08-05 PROCEDURE — 1160F RVW MEDS BY RX/DR IN RCRD: CPT | Performed by: NURSE PRACTITIONER

## 2024-08-05 PROCEDURE — 51798 US URINE CAPACITY MEASURE: CPT | Performed by: NURSE PRACTITIONER

## 2024-08-05 PROCEDURE — 1159F MED LIST DOCD IN RCRD: CPT | Performed by: NURSE PRACTITIONER

## 2024-08-05 PROCEDURE — 81003 URINALYSIS AUTO W/O SCOPE: CPT | Performed by: NURSE PRACTITIONER

## 2024-08-05 PROCEDURE — 99214 OFFICE O/P EST MOD 30 MIN: CPT | Performed by: NURSE PRACTITIONER

## 2024-08-05 RX ORDER — METHYLPREDNISOLONE 4 MG/1
TABLET ORAL
COMMUNITY
Start: 2024-07-31

## 2024-08-05 RX ORDER — ESTRADIOL 0.1 MG/G
CREAM VAGINAL
Qty: 42.5 G | Refills: 6 | Status: SHIPPED | OUTPATIENT
Start: 2024-08-05

## 2024-08-30 ENCOUNTER — TELEPHONE (OUTPATIENT)
Dept: FAMILY MEDICINE CLINIC | Facility: CLINIC | Age: 89
End: 2024-08-30

## 2024-09-03 ENCOUNTER — REFERRAL TRIAGE (OUTPATIENT)
Dept: CASE MANAGEMENT | Facility: OTHER | Age: 89
End: 2024-09-03
Payer: MEDICARE

## 2024-09-03 DIAGNOSIS — I48.21 PERMANENT ATRIAL FIBRILLATION: Primary | ICD-10-CM

## 2024-09-03 DIAGNOSIS — E03.9 HYPOTHYROIDISM, UNSPECIFIED TYPE: ICD-10-CM

## 2024-09-03 RX ORDER — LEVOFLOXACIN 500 MG/1
500 TABLET, FILM COATED ORAL DAILY
Qty: 7 TABLET | Refills: 0 | Status: CANCELLED | OUTPATIENT
Start: 2024-09-03

## 2024-09-03 RX ORDER — LEVOTHYROXINE SODIUM 175 UG/1
175 TABLET ORAL DAILY
Qty: 90 TABLET | Refills: 1 | Status: SHIPPED | OUTPATIENT
Start: 2024-09-03

## 2024-09-04 ENCOUNTER — PATIENT OUTREACH (OUTPATIENT)
Dept: CASE MANAGEMENT | Facility: OTHER | Age: 89
End: 2024-09-04
Payer: MEDICARE

## 2024-09-04 DIAGNOSIS — I48.21 PERMANENT ATRIAL FIBRILLATION: Primary | ICD-10-CM

## 2024-09-10 ENCOUNTER — PATIENT OUTREACH (OUTPATIENT)
Dept: CASE MANAGEMENT | Facility: OTHER | Age: 89
End: 2024-09-10
Payer: MEDICARE

## 2024-09-10 DIAGNOSIS — I48.21 PERMANENT ATRIAL FIBRILLATION: Primary | ICD-10-CM

## 2024-09-12 ENCOUNTER — PATIENT OUTREACH (OUTPATIENT)
Dept: CASE MANAGEMENT | Facility: OTHER | Age: 89
End: 2024-09-12
Payer: MEDICARE

## 2024-09-12 DIAGNOSIS — I48.21 PERMANENT ATRIAL FIBRILLATION: Primary | ICD-10-CM

## 2024-09-25 ENCOUNTER — PATIENT OUTREACH (OUTPATIENT)
Dept: CASE MANAGEMENT | Facility: OTHER | Age: 89
End: 2024-09-25
Payer: MEDICARE

## 2024-09-25 ENCOUNTER — TELEPHONE (OUTPATIENT)
Dept: CASE MANAGEMENT | Facility: OTHER | Age: 89
End: 2024-09-25
Payer: MEDICARE

## 2024-09-25 DIAGNOSIS — I48.21 PERMANENT ATRIAL FIBRILLATION: Primary | ICD-10-CM

## 2024-10-14 DIAGNOSIS — I48.21 PERMANENT ATRIAL FIBRILLATION: ICD-10-CM

## 2024-10-14 RX ORDER — DILTIAZEM HYDROCHLORIDE 180 MG/1
180 CAPSULE, COATED, EXTENDED RELEASE ORAL DAILY
Qty: 90 CAPSULE | Refills: 3 | OUTPATIENT
Start: 2024-10-14

## 2024-10-14 RX ORDER — DILTIAZEM HYDROCHLORIDE 180 MG/1
180 CAPSULE, COATED, EXTENDED RELEASE ORAL DAILY
Qty: 90 CAPSULE | Refills: 3 | Status: SHIPPED | OUTPATIENT
Start: 2024-10-14 | End: 2024-10-16 | Stop reason: SDUPTHER

## 2024-10-16 ENCOUNTER — TELEPHONE (OUTPATIENT)
Dept: FAMILY MEDICINE CLINIC | Facility: CLINIC | Age: 89
End: 2024-10-16
Payer: MEDICARE

## 2024-10-16 DIAGNOSIS — I48.21 PERMANENT ATRIAL FIBRILLATION: ICD-10-CM

## 2024-10-16 RX ORDER — DILTIAZEM HYDROCHLORIDE 180 MG/1
180 CAPSULE, COATED, EXTENDED RELEASE ORAL DAILY
Qty: 90 CAPSULE | Refills: 3 | Status: SHIPPED | OUTPATIENT
Start: 2024-10-16

## 2024-11-05 ENCOUNTER — OFFICE VISIT (OUTPATIENT)
Dept: UROLOGY | Age: 89
End: 2024-11-05
Payer: MEDICARE

## 2024-11-05 VITALS
HEIGHT: 62 IN | OXYGEN SATURATION: 96 % | DIASTOLIC BLOOD PRESSURE: 90 MMHG | HEART RATE: 88 BPM | BODY MASS INDEX: 30 KG/M2 | WEIGHT: 163 LBS | SYSTOLIC BLOOD PRESSURE: 143 MMHG

## 2024-11-05 DIAGNOSIS — R39.15 URGENCY OF URINATION: Primary | ICD-10-CM

## 2024-11-05 DIAGNOSIS — R82.71 ASYMPTOMATIC BACTERIURIA: ICD-10-CM

## 2024-11-05 DIAGNOSIS — N95.8 GENITOURINARY SYNDROME OF MENOPAUSE: ICD-10-CM

## 2024-11-05 LAB
BILIRUB BLD-MCNC: NEGATIVE MG/DL
CLARITY, POC: CLEAR
COLOR UR: YELLOW
EXPIRATION DATE: ABNORMAL
GLUCOSE UR STRIP-MCNC: NEGATIVE MG/DL
KETONES UR QL: NEGATIVE
LEUKOCYTE EST, POC: NEGATIVE
Lab: ABNORMAL
NITRITE UR-MCNC: NEGATIVE MG/ML
PH UR: 6 [PH] (ref 5–8)
PROT UR STRIP-MCNC: NEGATIVE MG/DL
RBC # UR STRIP: ABNORMAL /UL
SP GR UR: 1.02 (ref 1–1.03)
URINE VOLUME: 0
UROBILINOGEN UR QL: ABNORMAL

## 2024-11-05 NOTE — PROGRESS NOTES
Chief Complaint: Follow-up (Urgency of urination/)    Subjective         History of Present Illness  Melody Astudillo is a 89 y.o. female presents to [facility] to be seen for f/u  recurrent UTI.    At last visit we started patient on vaginal estrogen cream to see if this would help to alleviate some of her lower urinary tract symptoms and help to mitigate symptoms of UTI.    Not had any issues with UTIs since we last saw her.    No significant issues with vaginal itching.    She states that she feels like the estrogen cream is helping significantly with urinary urgency and frequency.    Still Having issues with incontinence.    Does wear pads.            Previous:     The patient saw her PCP on 2/20/2024 for 6-month follow-up on hypothyroidism.    Does not appear that the patient had any use specific urinary complaints in the body of the note however she has documented urinary urgency as a diagnosis.    A urinalysis and culture were ordered which did return consistent for a UTI.      It was documented from that urine culture to repeat culture in 2 weeks.    The patient reached out to PCP on 3/21/2024 with complaints of vaginal itching and burning    A repeat urine culture was performed on 4/12/2024 under diagnosis of urinary urgency frequency and vaginal itching however no documentation that the patient was having any symptoms.    Subsequent culture came back positive once again was prompted to repeat urine culture in 2 weeks.    Urine culture came back positive on 5/9/2024 once again with Klebsiella that was prompted again to repeat culture in 2 weeks.    Another culture was ordered once again on 7/10/2024.     Sates that she does not have any symptoms of a UTI.    She states that her biggest issue is incontinence.     She states intermittent issues with vaginal itching.    Her symptoms do not  improve after tx with antibiotics.           Urine cultures:  7/10/2024 Proteus Mirabilis  greater than 100,000 colony-forming's per milliliter resistant to nitrofurantoin.    6/10/2024 Klebsiella pneumoniae 50,000 colony-forming units per milliliter resistant to ampicillin, levofloxacin, intermediate susceptibility to nitrofurantoin, ampicillin/sulbactam.    5/9/2024 Klebsiella pneumonia greater than 100,000 colony-forming's per milliliter resistant to ampicillin, levofloxacin, intermediate susceptibility to nitrofurantoin, ampicillin/sulbactam    4/12/2024 Klebsiella pneumoniae greater than 100,000 coliform units per milliliter resistant to ampicillin, cefoxitin, intermediate susceptibility to nitrofurantoin.    2/20/2024 Klebsiella pneumoniae greater than 100,000 colony-forming's per milliliter resistant to ampicillin, levofloxacin, intermediate susceptibility nitrofurantoin    Objective     Past Medical History:   Diagnosis Date    Allergies     Heart murmur     Hepatitis     HEP B    Hypothyroidism        Past Surgical History:   Procedure Laterality Date    CHOLECYSTECTOMY      EYE SURGERY      PACEMAKER IMPLANTATION  12/01/2018    SUBTOTAL HYSTERECTOMY           Current Outpatient Medications:     aspirin 81 MG chewable tablet, Chew 1 tablet Daily., Disp: , Rfl:     carvedilol (COREG) 12.5 MG tablet, Take 1 tablet by mouth Every 12 (Twelve) Hours., Disp: 30 tablet, Rfl: 1    dilTIAZem CD (CARDIZEM CD) 180 MG 24 hr capsule, Take 1 capsule by mouth Daily., Disp: 90 capsule, Rfl: 3    Elderberry 500 MG capsule, Take 1 capsule by mouth Daily., Disp: , Rfl:     estradiol (ESTRACE) 0.1 MG/GM vaginal cream, Apply 0.5gm  inside the vagina and rub in and 0.5gm around the vestibule and massage and as well as around urethra.  3 times a week at night, Disp: 42.5 g, Rfl: 6    levothyroxine (SYNTHROID, LEVOTHROID) 175 MCG tablet, Take 1 tablet by mouth Daily., Disp: 90 tablet, Rfl: 1    rivaroxaban (Xarelto) 15 MG tablet, Take 1 tablet by mouth Daily., Disp: 90 tablet, Rfl: 1    Allergies   Allergen Reactions     "Ct Contrast Unknown - High Severity     States she can't remember    Atorvastatin Unknown - Low Severity    Iodinated Contrast Media Unknown - Low Severity    Lovastatin Unknown - Low Severity     MUSCLE ACHES    Penicillins Unknown - Low Severity    Rosuvastatin Calcium Unknown - Low Severity    Simvastatin Unknown - Low Severity    Sulfa Antibiotics Unknown - Low Severity    Latex Unknown - Low Severity        History reviewed. No pertinent family history.    Social History     Socioeconomic History    Marital status:    Tobacco Use    Smoking status: Never     Passive exposure: Past    Smokeless tobacco: Never   Vaping Use    Vaping status: Never Used   Substance and Sexual Activity    Alcohol use: Never    Drug use: Never    Sexual activity: Not Currently       Vital Signs:   /90 (BP Location: Right arm, Patient Position: Sitting, Cuff Size: Adult)   Pulse 88   Ht 157.5 cm (62.01\")   Wt 73.9 kg (163 lb)   SpO2 96%   BMI 29.81 kg/m²      Physical Exam     Result Review :   The following data was reviewed by: IGOR Reynaga on 11/05/2024:  Results for orders placed or performed in visit on 11/05/24   Bladder Scan    Collection Time: 11/05/24 11:17 AM   Result Value Ref Range    Urine Volume 0    POC Urinalysis Dipstick, Automated    Collection Time: 11/05/24 11:22 AM    Specimen: Urine   Result Value Ref Range    Color Yellow Yellow, Straw, Dark Yellow, Mirna    Clarity, UA Clear Clear    Specific Gravity  1.025 1.005 - 1.030    pH, Urine 6.0 5.0 - 8.0    Leukocytes Negative Negative    Nitrite, UA Negative Negative    Protein, POC Negative Negative mg/dL    Glucose, UA Negative Negative mg/dL    Ketones, UA Negative Negative    Urobilinogen, UA 0.2 E.U./dL Normal, 0.2 E.U./dL    Bilirubin Negative Negative    Blood, UA Trace (A) Negative    Lot Number [id number]     Expiration Date 52,025       Bladder Scan interpretation 11/05/2024    Estimation of residual urine via [employer] [address] " Bladder Scan  MA/nurse performing: anthony peck   Residual Urine: 0 ml  Indication: Urgency of urination    Genitourinary syndrome of menopause    Asymptomatic bacteriuria   Position: Supine  Examination: Incremental scanning of the suprapubic area using 2.0 MHz transducer using copious amounts of acoustic gel.   Findings: An anechoic area was demonstrated which represented the bladder, with measurement of residual urine as noted. I inspected this myself. In that the residual urine was stable or insignificant, refer to plan for treatment and plan necessary at this time.            Procedures        Assessment and Plan    Diagnoses and all orders for this visit:    1. Urgency of urination (Primary)  -     POC Urinalysis Dipstick, Automated  -     Bladder Scan    2. Genitourinary syndrome of menopause    3. Asymptomatic bacteriuria        She is dong well today.    No significant issues.     She will continue estrace.         I spent 10 minutes caring for Melody on this date of service. This time includes time spent by me in the following activities:reviewing tests, obtaining and/or reviewing a separately obtained history, performing a medically appropriate examination and/or evaluation , counseling and educating the patient/family/caregiver, ordering medications, tests, or procedures, and documenting information in the medical record  Follow Up   Return in about 6 months (around 5/5/2025) for f/u incontinence/ gsm/ .  Patient was given instructions and counseling regarding her condition or for health maintenance advice. Please see specific information pulled into the AVS if appropriate.         This document has been electronically signed by IGOR Reynaga  November 5, 2024 11:37 EST

## 2024-12-02 NOTE — PROGRESS NOTES
Subjective   The ABCs of the Annual Wellness Visit  Medicare Wellness Visit      Melody Astudillo is a 89 y.o. patient who presents for a Medicare Wellness Visit.    The following portions of the patient's history were reviewed and   updated as appropriate: allergies, current medications, past family history, past medical history, past social history, past surgical history, and problem list.    Compared to one year ago, the patient's physical   health is the same.  Compared to one year ago, the patient's mental   health is the same.    Recent Hospitalizations:  She was not admitted to the hospital during the last year.     Current Medical Providers:  Patient Care Team:  Sheyla Raya APRN as PCP - General (Nurse Practitioner)  Ivette Jaime APRN as Nurse Practitioner (Urology)    Outpatient Medications Prior to Visit   Medication Sig Dispense Refill    aspirin 81 MG chewable tablet Chew 1 tablet Daily.      carvedilol (COREG) 12.5 MG tablet Take 1 tablet by mouth Every 12 (Twelve) Hours. 30 tablet 1    dilTIAZem CD (CARDIZEM CD) 180 MG 24 hr capsule Take 1 capsule by mouth Daily. 90 capsule 3    Elderberry 500 MG capsule Take 1 capsule by mouth Daily.      estradiol (ESTRACE) 0.1 MG/GM vaginal cream Apply 0.5gm  inside the vagina and rub in and 0.5gm around the vestibule and massage and as well as around urethra.  3 times a week at night 42.5 g 6    levothyroxine (SYNTHROID, LEVOTHROID) 175 MCG tablet Take 1 tablet by mouth Daily. 90 tablet 1    rivaroxaban (Xarelto) 15 MG tablet Take 1 tablet by mouth Daily. 90 tablet 1     No facility-administered medications prior to visit.     No opioid medication identified on active medication list. I have reviewed chart for other potential  high risk medication/s and harmful drug interactions in the elderly.      Aspirin is on active medication list. Aspirin use is indicated based on review of current medical condition/s. Pros and cons of this therapy have  "been discussed today. Benefits of this medication outweigh potential harm.  Patient has been encouraged to continue taking this medication.  .      Patient Active Problem List   Diagnosis    Hypothyroidism    Heart murmur    Permanent atrial fibrillation    Pacemaker    CHF (congestive heart failure)    Asthma    Back pain    Hyperlipidemia    Hypertension    Mitral regurgitation     Advance Care Planning Advance Directive is on file.  ACP discussion was held with the patient during this visit. Patient has an advance directive in EMR which is still valid.             Objective   Vitals:    24 1104   BP: 105/67   Pulse: 77   SpO2: 98%   Weight: 75.3 kg (166 lb)   Height: 157.5 cm (62.01\")   PainSc: 0-No pain       Estimated body mass index is 30.35 kg/m² as calculated from the following:    Height as of this encounter: 157.5 cm (62.01\").    Weight as of this encounter: 75.3 kg (166 lb).        Does the patient have evidence of cognitive impairment? No                                                                                                Health  Risk Assessment    Smoking Status:  Social History     Tobacco Use   Smoking Status Never    Passive exposure: Past   Smokeless Tobacco Never     Alcohol Consumption:  Social History     Substance and Sexual Activity   Alcohol Use Never       Fall Risk Screen  STEADI Fall Risk Assessment was completed, and patient is at LOW risk for falls.Assessment completed on:2024    Depression Screening   Little interest or pleasure in doing things? Not at all   Feeling down, depressed, or hopeless? Not at all   PHQ-2 Total Score 0      Health Habits and Functional and Cognitive Screenin/4/2024    11:00 AM   Functional & Cognitive Status   Do you have difficulty preparing food and eating? Yes   Do you have difficulty bathing yourself, getting dressed or grooming yourself? No   Do you have difficulty using the toilet? No   Do you have difficulty moving around " from place to place? No   Do you have trouble with steps or getting out of a bed or a chair? No   Current Diet Limited Junk Food   Dental Exam Not up to date   Eye Exam Not up to date   Exercise (times per week) 6 times per week   Current Exercises Include Other   Do you need help using the phone?  No   Are you deaf or do you have serious difficulty hearing?  Yes   Do you need help to go to places out of walking distance? No   Do you need help shopping? Yes   Do you need help preparing meals?  Yes   Do you need help with housework?  Yes   Do you need help with laundry? No   Do you need help taking your medications? No   Do you need help managing money? No   Do you ever drive or ride in a car without wearing a seat belt? Yes   Have you felt unusual stress, anger or loneliness in the last month? No   Who do you live with? Alone   If you need help, do you have trouble finding someone available to you? No   Have you been bothered in the last four weeks by sexual problems? No   Do you have difficulty concentrating, remembering or making decisions? No           Age-appropriate Screening Schedule:  Refer to the list below for future screening recommendations based on patient's age, sex and/or medical conditions. Orders for these recommended tests are listed in the plan section. The patient has been provided with a written plan.    Health Maintenance List  Health Maintenance   Topic Date Due    DXA SCAN  12/04/2024 (Originally 1934)    ZOSTER VACCINE (1 of 2) 12/04/2024 (Originally [birth year]/29/1984)    COVID-19 Vaccine (1 - 2024-25 season) 12/06/2024 (Originally 9/1/2024)    RSV Vaccine - Adults (1 - 1-dose 75+ series) 02/20/2025 (Originally 11/29/2009)    Pneumococcal Vaccine 65+ (1 of 2 - PCV) 02/20/2025 (Originally 11/29/1940)    TDAP/TD VACCINES (1 - Tdap) 02/20/2025 (Originally 11/29/1953)    INFLUENZA VACCINE  03/31/2025 (Originally 7/1/2024)    LIPID PANEL  02/20/2025    BMI FOLLOWUP  02/20/2025    ANNUAL WELLNESS  "VISIT  12/04/2025                                                                                                                                                CMS Preventative Services Quick Reference  Risk Factors Identified During Encounter  None Identified    The above risks/problems have been discussed with the patient.  Pertinent information has been shared with the patient in the After Visit Summary.  An After Visit Summary and PPPS were made available to the patient.    Follow Up:   Next Medicare Wellness visit to be scheduled in 1 year.          Additional E&M Note during same encounter follows:  Patient has multiple medical problems which are significant and separately identifiable that require additional work above and beyond the Medicare Wellness Visit.      Chief Complaint  Medicare Wellness-subsequent and Pain (Left sided pain)    Melody Astudillo is a 89 y.o. female who presents to [facility]       Objective   Vital Signs:   Vitals:    12/04/24 1104   BP: 105/67   Pulse: 77   SpO2: 98%   Weight: 75.3 kg (166 lb)   Height: 157.5 cm (62.01\")   PainSc: 0-No pain       Wt Readings from Last 3 Encounters:   12/04/24 75.3 kg (166 lb)   11/05/24 73.9 kg (163 lb)   08/05/24 74.2 kg (163 lb 9.6 oz)     BP Readings from Last 3 Encounters:   12/04/24 105/67   11/05/24 143/90   08/05/24 119/72       Physical Exam  Vitals reviewed.   Constitutional:       Appearance: Normal appearance.   Cardiovascular:      Rate and Rhythm: Normal rate and regular rhythm.      Pulses: Normal pulses.      Heart sounds: Normal heart sounds.   Pulmonary:      Effort: Pulmonary effort is normal.      Breath sounds: Normal breath sounds.   Skin:     General: Skin is warm and dry.   Neurological:      Mental Status: She is alert and oriented to person, place, and time.   Psychiatric:         Mood and Affect: Mood normal.         Behavior: Behavior normal.         Thought Content: Thought content " normal.         Judgment: Judgment normal.         The following data was reviewed by IGOR Katz on 12/04/2024  Common Labs   Common labs          2/20/2024    10:51   Common Labs   Glucose 98    BUN 23    Creatinine 1.18    Sodium 138    Potassium 4.2    Chloride 101    Calcium 9.7    Albumin 4.0    Total Bilirubin 0.4    Alkaline Phosphatase 139    AST (SGOT) 20    ALT (SGPT) 11    WBC 7.52    Hemoglobin 12.5    Hematocrit 38.8    Platelets 223    Total Cholesterol 196    Triglycerides 143    HDL Cholesterol 40    LDL Cholesterol  130        Assessment & Plan   Diagnoses and all orders for this visit:    1. Routine general medical examination at a health care facility (Primary)    2. Atrophic vaginitis  Comments:  Continue estradiol cream prn    3. Hypothyroidism, unspecified type  -     TSH; Future  -     T4, free; Future    4. Permanent atrial fibrillation  -     CBC (No Diff); Future    5. Moderate mixed hyperlipidemia not requiring statin therapy  -     Lipid Panel; Future    6. Primary hypertension  -     Comprehensive Metabolic Panel; Future  -     CBC (No Diff); Future      FOLLOW UP    Return in about 6 months (around 6/4/2025).    Patient was given instructions and counseling regarding her condition or for health maintenance advice. Please see specific information pulled into the AVS if appropriate.     IOGR Katz  12/04/24  11:37 EST

## 2024-12-04 ENCOUNTER — OFFICE VISIT (OUTPATIENT)
Dept: FAMILY MEDICINE CLINIC | Facility: CLINIC | Age: 89
End: 2024-12-04
Payer: MEDICARE

## 2024-12-04 VITALS
SYSTOLIC BLOOD PRESSURE: 105 MMHG | WEIGHT: 166 LBS | HEIGHT: 62 IN | HEART RATE: 77 BPM | BODY MASS INDEX: 30.55 KG/M2 | OXYGEN SATURATION: 98 % | DIASTOLIC BLOOD PRESSURE: 67 MMHG

## 2024-12-04 DIAGNOSIS — E03.9 HYPOTHYROIDISM, UNSPECIFIED TYPE: ICD-10-CM

## 2024-12-04 DIAGNOSIS — Z00.00 ROUTINE GENERAL MEDICAL EXAMINATION AT A HEALTH CARE FACILITY: Primary | ICD-10-CM

## 2024-12-04 DIAGNOSIS — I10 PRIMARY HYPERTENSION: ICD-10-CM

## 2024-12-04 DIAGNOSIS — N95.2 ATROPHIC VAGINITIS: ICD-10-CM

## 2024-12-04 DIAGNOSIS — E78.2 MODERATE MIXED HYPERLIPIDEMIA NOT REQUIRING STATIN THERAPY: ICD-10-CM

## 2024-12-04 DIAGNOSIS — I48.21 PERMANENT ATRIAL FIBRILLATION: ICD-10-CM

## 2024-12-04 PROCEDURE — 1159F MED LIST DOCD IN RCRD: CPT | Performed by: NURSE PRACTITIONER

## 2024-12-04 PROCEDURE — G0439 PPPS, SUBSEQ VISIT: HCPCS | Performed by: NURSE PRACTITIONER

## 2024-12-04 PROCEDURE — 99214 OFFICE O/P EST MOD 30 MIN: CPT | Performed by: NURSE PRACTITIONER

## 2024-12-04 PROCEDURE — 1170F FXNL STATUS ASSESSED: CPT | Performed by: NURSE PRACTITIONER

## 2024-12-04 PROCEDURE — 1160F RVW MEDS BY RX/DR IN RCRD: CPT | Performed by: NURSE PRACTITIONER

## 2024-12-04 PROCEDURE — 1126F AMNT PAIN NOTED NONE PRSNT: CPT | Performed by: NURSE PRACTITIONER

## 2025-01-14 ENCOUNTER — TELEPHONE (OUTPATIENT)
Dept: FAMILY MEDICINE CLINIC | Facility: CLINIC | Age: OVER 89
End: 2025-01-14
Payer: MEDICARE

## 2025-01-14 NOTE — TELEPHONE ENCOUNTER
Called patient and spoke with her. Patient had appointment scheduled with IGOR Jarquin on 6/3/2025. Appointment needs to be rescheduled with different provider due to current provider leaving the practice. Appointment needs to be made with either Dr. Clarke or Dr. Schaeffer. Any other providers in same office require confirmation through them before scheduling due to large patient volume.    Patient said she would call back to reschedule.    HUB OK TO READ/RELAY AND RESCHEDULE APPT

## 2025-01-31 DIAGNOSIS — I48.21 PERMANENT ATRIAL FIBRILLATION: ICD-10-CM

## 2025-01-31 RX ORDER — CARVEDILOL 12.5 MG/1
12.5 TABLET ORAL 2 TIMES DAILY
Qty: 180 TABLET | Refills: 3 | Status: SHIPPED | OUTPATIENT
Start: 2025-01-31

## 2025-02-27 ENCOUNTER — TELEPHONE (OUTPATIENT)
Dept: OBSTETRICS AND GYNECOLOGY | Age: OVER 89
End: 2025-02-27

## 2025-02-27 NOTE — TELEPHONE ENCOUNTER
Provider: DR. GARDNER    Caller: LEVI TORRE    Relationship to Patient: Emergency Contact    Pharmacy:     Phone Number: [phone]    Reason for Call: REFERRAL [facility] VAGINAL ITCHING    When was the patient last seen: NEW GYN NEVER SEEN     ELVI  CALLED AND PLACE REFERRAL FOR HER MOTHER -FINA ELISE FOR VAGINAL ITCHING. DR GARDNER DOES NOT HAVE ANY OPENING FOR OVER 60 DAY AT EITHER OFFICE. PLEASE ADVISE ON SCHEDULING.    LEVI  IS THE PATIENTS DAUGHTER AND CAN BE REACHED .[phone]    THANK YOU

## 2025-03-05 DIAGNOSIS — E03.9 HYPOTHYROIDISM, UNSPECIFIED TYPE: ICD-10-CM

## 2025-03-05 RX ORDER — LEVOTHYROXINE SODIUM 175 UG/1
175 TABLET ORAL DAILY
Qty: 90 TABLET | Refills: 1 | Status: SHIPPED | OUTPATIENT
Start: 2025-03-05

## 2025-03-11 NOTE — TELEPHONE ENCOUNTER
Spoke with patient , will call her back tomorrow Wednesday after 10 am  because she wants to talk with her daughter which location  was closer to home.

## 2025-03-18 ENCOUNTER — OFFICE VISIT (OUTPATIENT)
Dept: OBSTETRICS AND GYNECOLOGY | Age: OVER 89
End: 2025-03-18
Payer: MEDICARE

## 2025-03-18 VITALS
BODY MASS INDEX: 30.55 KG/M2 | HEIGHT: 62 IN | DIASTOLIC BLOOD PRESSURE: 70 MMHG | WEIGHT: 166 LBS | SYSTOLIC BLOOD PRESSURE: 112 MMHG

## 2025-03-18 DIAGNOSIS — Z13.89 SCREENING FOR BLOOD OR PROTEIN IN URINE: ICD-10-CM

## 2025-03-18 DIAGNOSIS — N90.4 LICHEN SCLEROSUS OF FEMALE GENITALIA: Primary | ICD-10-CM

## 2025-03-18 DIAGNOSIS — N90.89 OTHER SPECIFIED NONINFLAMMATORY DISORDERS OF VULVA AND PERINEUM: ICD-10-CM

## 2025-03-18 LAB
BILIRUB BLD-MCNC: NEGATIVE MG/DL
GLUCOSE UR STRIP-MCNC: NEGATIVE MG/DL
KETONES UR QL: NEGATIVE
LEUKOCYTE EST, POC: NEGATIVE
NITRITE UR-MCNC: NEGATIVE MG/ML
PH UR: 5.5 [PH] (ref 5–8)
PROT UR STRIP-MCNC: NEGATIVE MG/DL
RBC # UR STRIP: ABNORMAL /UL
SP GR UR: 1.02 (ref 1–1.03)
UROBILINOGEN UR QL: NORMAL

## 2025-03-18 RX ORDER — ALBUTEROL SULFATE 0.83 MG/ML
2.5 SOLUTION RESPIRATORY (INHALATION)
COMMUNITY
Start: 2024-12-09

## 2025-03-18 RX ORDER — ERYTHROMYCIN 5 MG/G
OINTMENT OPHTHALMIC
COMMUNITY
Start: 2025-02-25

## 2025-03-18 RX ORDER — CLOBETASOL PROPIONATE 0.5 MG/G
1 OINTMENT TOPICAL NIGHTLY
Qty: 30 G | Refills: 2 | Status: SHIPPED | OUTPATIENT
Start: 2025-03-18

## 2025-03-18 NOTE — PROGRESS NOTES
"Yolette Astudillo is a 89 y.o. female presents as new gyn.  Her daughter is present at the appointment and she is your patient as well.  Patient c/o vaginal itching and burning , did not see gyno specialist in many years, she had 4 children's vaginal delivery . She had used estradiol vaginal cream and had reaction very red skin and irritation  . Hx hysterectomy , has a pacemaker .  Patient reports significant vulvar itching that is intermittent and not worse at any particular time of day or night.  She does report that when she scratches she can sometimes see a small amount of blood in her panty liner.  She does report wearing a pad daily 24 hours a day for urinary incontinence.  Lengthy discussion with patient and her daughter who is present with her regarding vulvar hygiene and to attempt to take breaks from the pad as frequently as possible.  Also advised unscented Cottonelle wipes for wiping and clobetasol cream nightly for the next 4 weeks to see if any improvement in pruritus.  Patient has also been on antibiotics quite frequently in the past year so we will also check a swab for yeast.    Gynecologic Exam  Pertinent negatives include no chills or fever.       The following portions of the patient's history were reviewed and updated as appropriate: allergies, current medications, past family history, past medical history, past social history, past surgical history, and problem list.    Review of Systems   Constitutional:  Negative for chills, fatigue and fever.   Genitourinary:  Positive for vaginal pain.        + Vulvar itching   All other systems reviewed and are negative.    /70   Ht 157.5 cm (62.01\")   Wt 75.3 kg (166 lb)   BMI 30.35 kg/m²     Objective   Physical Exam  Vitals and nursing note reviewed.   Constitutional:       Appearance: Normal appearance. She is normal weight.   Pulmonary:      Effort: Pulmonary effort is normal.   Genitourinary:     Exam position: Lithotomy " position.      Labia:         Right: No tenderness or lesion.         Left: No tenderness or lesion.       Urethra: No prolapse or urethral swelling.      Vagina: Erythema present. No vaginal discharge, bleeding or prolapsed vaginal walls.          Comments: Erythema bilaterally of labia majora  Erythema and petechiae consistent with vaginal atrophy and mild lichen of labia minora-  No excoriation or leukoplakia identified  Neurological:      Mental Status: She is alert and oriented to person, place, and time.   Psychiatric:         Mood and Affect: Mood normal.         Behavior: Behavior normal.         Assessment & Plan   Diagnoses and all orders for this visit:    1. Lichen sclerosus of female genitalia (Primary)  -     clobetasol (TEMOVATE) 0.05 % ointment; Apply 1 Application topically to the appropriate area as directed Every Night.  Dispense: 30 g; Refill: 2  -     NuSwab BV & Candida - Swab, Vagina    2. Screening for blood or protein in urine  -     POC Urinalysis Dipstick    3. Other specified noninflammatory disorders of vulva and perineum  -     NuSwab BV & Candida - Swab, Vagina       Counseling was given to patient and family for the following topics: instructions for management, risk factor reductions, prognosis, impressions, risks and benefits of treatment options, and importance of treatment compliance . Total time of the encounter was 30 minutes and 25 minutes was spent counseling.  Return in about 4 weeks (around 4/15/2025).

## 2025-03-20 LAB
A VAGINAE DNA VAG QL NAA+PROBE: NORMAL SCORE
BVAB2 DNA VAG QL NAA+PROBE: NORMAL SCORE
C ALBICANS DNA VAG QL NAA+PROBE: NEGATIVE
C GLABRATA DNA VAG QL NAA+PROBE: NEGATIVE
MEGA1 DNA VAG QL NAA+PROBE: NORMAL SCORE

## 2025-04-15 ENCOUNTER — OFFICE VISIT (OUTPATIENT)
Dept: OBSTETRICS AND GYNECOLOGY | Age: OVER 89
End: 2025-04-15
Payer: MEDICARE

## 2025-04-15 VITALS
HEIGHT: 62 IN | SYSTOLIC BLOOD PRESSURE: 110 MMHG | BODY MASS INDEX: 30.55 KG/M2 | WEIGHT: 166 LBS | DIASTOLIC BLOOD PRESSURE: 70 MMHG

## 2025-04-15 DIAGNOSIS — N90.4 LICHEN SCLEROSUS OF FEMALE GENITALIA: Primary | ICD-10-CM

## 2025-04-15 PROCEDURE — 99213 OFFICE O/P EST LOW 20 MIN: CPT | Performed by: OBSTETRICS & GYNECOLOGY

## 2025-04-15 PROCEDURE — 1159F MED LIST DOCD IN RCRD: CPT | Performed by: OBSTETRICS & GYNECOLOGY

## 2025-04-15 PROCEDURE — 1160F RVW MEDS BY RX/DR IN RCRD: CPT | Performed by: OBSTETRICS & GYNECOLOGY

## 2025-04-15 NOTE — PROGRESS NOTES
Subjective   Melody Astudillo is a 89 y.o. female  presents for 4 week follow up on Lichen sclerosus uses  clobetasol , doing great , itching decreased and irritation is better . Reports not wearing pads has helped tremendously.  Gave instructions on how to continue clobetasol.  Vies patient to contact me for any other issues.  I last saw her 4 weeks ago as a new gyn.  Her daughter is present at the appointment and she is your patient as well.  Patient c/o vaginal itching and burning , did not see gyno specialist in many years, she had 4 children's vaginal delivery . She had used estradiol vaginal cream and had reaction very red skin and irritation  . Hx hysterectomy , has a pacemaker .  Patient reports significant vulvar itching that is intermittent and not worse at any particular time of day or night.  She does report that when she scratches she can sometimes see a small amount of blood in her panty liner.  She does report wearing a pad daily 24 hours a day for urinary incontinence.  Lengthy discussion with patient and her daughter who is present with her regarding vulvar hygiene and to attempt to take breaks from the pad as frequently as possible.  Also advised unscented Cottonelle wipes for wiping and clobetasol cream nightly for the next 4 weeks to see if any improvement in pruritus.  Patient has also been on antibiotics quite frequently in the past year so we will also check a swab for yeast.  Gynecologic Exam  The patient's pertinent negatives include no pelvic pain or vaginal discharge. Pertinent negatives include no abdominal pain, chills, dysuria or fever.       The following portions of the patient's history were reviewed and updated as appropriate: allergies, current medications, past family history, past medical history, past social history, past surgical history, and problem list.    Review of Systems   Constitutional:  Negative for chills, fatigue and fever.   Gastrointestinal:  Negative for  "abdominal pain.   Genitourinary:  Negative for dysuria, pelvic pain, vaginal bleeding, vaginal discharge and vaginal pain.   All other systems reviewed and are negative.    /70   Ht 157.5 cm (62.01\")   Wt 75.3 kg (166 lb)   BMI 30.35 kg/m²     Objective   Physical Exam  Vitals and nursing note reviewed.   Constitutional:       Appearance: Normal appearance. She is normal weight.   Pulmonary:      Effort: Pulmonary effort is normal.   Neurological:      Mental Status: She is alert and oriented to person, place, and time.   Psychiatric:         Mood and Affect: Mood normal.         Behavior: Behavior normal.         Assessment & Plan   Diagnoses and all orders for this visit:    1. Lichen sclerosus of female genitalia (Primary)       Counseling was given to patient and daughter for the following topics: instructions for management, impressions, and importance of treatment compliance . Total time of the encounter was 20 minutes and 15 minutes was spent counseling.             "

## 2025-05-05 ENCOUNTER — LAB (OUTPATIENT)
Facility: HOSPITAL | Age: OVER 89
End: 2025-05-05
Payer: MEDICARE

## 2025-05-05 ENCOUNTER — OFFICE VISIT (OUTPATIENT)
Dept: UROLOGY | Age: OVER 89
End: 2025-05-05
Payer: MEDICARE

## 2025-05-05 VITALS — BODY MASS INDEX: 30.55 KG/M2 | RESPIRATION RATE: 16 BRPM | WEIGHT: 166 LBS | HEIGHT: 62 IN

## 2025-05-05 DIAGNOSIS — E78.2 MODERATE MIXED HYPERLIPIDEMIA NOT REQUIRING STATIN THERAPY: ICD-10-CM

## 2025-05-05 DIAGNOSIS — I48.21 PERMANENT ATRIAL FIBRILLATION: ICD-10-CM

## 2025-05-05 DIAGNOSIS — R39.15 URGENCY OF URINATION: Primary | ICD-10-CM

## 2025-05-05 DIAGNOSIS — I10 PRIMARY HYPERTENSION: ICD-10-CM

## 2025-05-05 DIAGNOSIS — E03.9 HYPOTHYROIDISM, UNSPECIFIED TYPE: ICD-10-CM

## 2025-05-05 DIAGNOSIS — N39.41 URGE INCONTINENCE: ICD-10-CM

## 2025-05-05 LAB
ALBUMIN SERPL-MCNC: 4.1 G/DL (ref 3.5–5.2)
ALBUMIN/GLOB SERPL: 1.3 G/DL
ALP SERPL-CCNC: 118 U/L (ref 39–117)
ALT SERPL W P-5'-P-CCNC: 10 U/L (ref 1–33)
ANION GAP SERPL CALCULATED.3IONS-SCNC: 11 MMOL/L (ref 5–15)
AST SERPL-CCNC: 17 U/L (ref 1–32)
BILIRUB BLD-MCNC: NEGATIVE MG/DL
BILIRUB SERPL-MCNC: 0.5 MG/DL (ref 0–1.2)
BUN SERPL-MCNC: 27 MG/DL (ref 8–23)
BUN/CREAT SERPL: 26.2 (ref 7–25)
CALCIUM SPEC-SCNC: 9.4 MG/DL (ref 8.2–9.6)
CHLORIDE SERPL-SCNC: 103 MMOL/L (ref 98–107)
CHOLEST SERPL-MCNC: 185 MG/DL (ref 0–200)
CLARITY, POC: ABNORMAL
CO2 SERPL-SCNC: 24 MMOL/L (ref 22–29)
COLOR UR: YELLOW
CREAT SERPL-MCNC: 1.03 MG/DL (ref 0.57–1)
DEPRECATED RDW RBC AUTO: 47.8 FL (ref 37–54)
EGFRCR SERPLBLD CKD-EPI 2021: 51.8 ML/MIN/1.73
ERYTHROCYTE [DISTWIDTH] IN BLOOD BY AUTOMATED COUNT: 14.3 % (ref 12.3–15.4)
EXPIRATION DATE: ABNORMAL
GLOBULIN UR ELPH-MCNC: 3.1 GM/DL
GLUCOSE SERPL-MCNC: 82 MG/DL (ref 65–99)
GLUCOSE UR STRIP-MCNC: NEGATIVE MG/DL
HCT VFR BLD AUTO: 42.1 % (ref 34–46.6)
HDLC SERPL-MCNC: 37 MG/DL (ref 40–60)
HGB BLD-MCNC: 13.6 G/DL (ref 12–15.9)
KETONES UR QL: NEGATIVE
LDLC SERPL CALC-MCNC: 129 MG/DL (ref 0–100)
LDLC/HDLC SERPL: 3.45 {RATIO}
LEUKOCYTE EST, POC: ABNORMAL
Lab: ABNORMAL
MCH RBC QN AUTO: 29.4 PG (ref 26.6–33)
MCHC RBC AUTO-ENTMCNC: 32.3 G/DL (ref 31.5–35.7)
MCV RBC AUTO: 91.1 FL (ref 79–97)
NITRITE UR-MCNC: NEGATIVE MG/ML
PH UR: 5.5 [PH] (ref 5–8)
PLATELET # BLD AUTO: 195 10*3/MM3 (ref 140–450)
PMV BLD AUTO: 12.4 FL (ref 6–12)
POTASSIUM SERPL-SCNC: 4.5 MMOL/L (ref 3.5–5.2)
PROT SERPL-MCNC: 7.2 G/DL (ref 6–8.5)
PROT UR STRIP-MCNC: NEGATIVE MG/DL
RBC # BLD AUTO: 4.62 10*6/MM3 (ref 3.77–5.28)
RBC # UR STRIP: NEGATIVE /UL
SODIUM SERPL-SCNC: 138 MMOL/L (ref 136–145)
SP GR UR: 1.02 (ref 1–1.03)
T4 FREE SERPL-MCNC: 1.57 NG/DL (ref 0.92–1.68)
TRIGL SERPL-MCNC: 101 MG/DL (ref 0–150)
TSH SERPL DL<=0.05 MIU/L-ACNC: 0.85 UIU/ML (ref 0.27–4.2)
URINE VOLUME: 0
UROBILINOGEN UR QL: ABNORMAL
VLDLC SERPL-MCNC: 19 MG/DL (ref 5–40)
WBC NRBC COR # BLD AUTO: 7.83 10*3/MM3 (ref 3.4–10.8)

## 2025-05-05 PROCEDURE — 80053 COMPREHEN METABOLIC PANEL: CPT

## 2025-05-05 PROCEDURE — 85027 COMPLETE CBC AUTOMATED: CPT

## 2025-05-05 PROCEDURE — 1160F RVW MEDS BY RX/DR IN RCRD: CPT | Performed by: NURSE PRACTITIONER

## 2025-05-05 PROCEDURE — 80061 LIPID PANEL: CPT

## 2025-05-05 PROCEDURE — 99213 OFFICE O/P EST LOW 20 MIN: CPT | Performed by: NURSE PRACTITIONER

## 2025-05-05 PROCEDURE — 1159F MED LIST DOCD IN RCRD: CPT | Performed by: NURSE PRACTITIONER

## 2025-05-05 PROCEDURE — 84443 ASSAY THYROID STIM HORMONE: CPT

## 2025-05-05 PROCEDURE — 84439 ASSAY OF FREE THYROXINE: CPT

## 2025-05-05 PROCEDURE — 81003 URINALYSIS AUTO W/O SCOPE: CPT | Performed by: NURSE PRACTITIONER

## 2025-05-05 PROCEDURE — 51798 US URINE CAPACITY MEASURE: CPT | Performed by: NURSE PRACTITIONER

## 2025-05-05 NOTE — PROGRESS NOTES
Chief Complaint: Urinary Urgency (6 month F/U)    Subjective         History of Present Illness  Melody Astudillo is a 89 y.o. female presents to [facility] to be seen for f/u  recurrent UTI.    The patient is no longer on the vaginal estrogen cream.     She states that second tube of the estrogen cream caused significant itching and vaginal irritation so she stopped this.    She has seen another urologist in [place] that her daughter had seen .     She was actually seen by an OB/ GYN and dx with lichen sclerosus and  She was given a steroid cream from them and was to do this with a taper dose to as needed use for itching.     She was released for PRN f/u.     Se is on the clobetasol every other night for now and will taper to PRN dosing.     She is doing well lat this time.    She is still with some urgency and is wearing pads all day long.     She wears the large pads and is soaking these at night.         Previous:     At last visit we started patient on vaginal estrogen cream to see if this would help to alleviate some of her lower urinary tract symptoms and help to mitigate symptoms of UTI.    Not had any issues with UTIs since we last saw her.    No significant issues with vaginal itching.    She states that she feels like the estrogen cream is helping significantly with urinary urgency and frequency.    Still Having issues with incontinence.    Does wear pads.            Previous:     The patient saw her PCP on 2/20/2024 for 6-month follow-up on hypothyroidism.    Does not appear that the patient had any use specific urinary complaints in the body of the note however she has documented urinary urgency as a diagnosis.    A urinalysis and culture were ordered which did return consistent for a UTI.      It was documented from that urine culture to repeat culture in 2 weeks.    The patient reached out to PCP on 3/21/2024 with complaints of vaginal itching and burning    A repeat  urine culture was performed on 4/12/2024 under diagnosis of urinary urgency frequency and vaginal itching however no documentation that the patient was having any symptoms.    Subsequent culture came back positive once again was prompted to repeat urine culture in 2 weeks.    Urine culture came back positive on 5/9/2024 once again with Klebsiella that was prompted again to repeat culture in 2 weeks.    Another culture was ordered once again on 7/10/2024.     Sates that she does not have any symptoms of a UTI.    She states that her biggest issue is incontinence.     She states intermittent issues with vaginal itching.    Her symptoms do not  improve after tx with antibiotics.           Urine cultures:  7/10/2024 Proteus Mirabilis greater than 100,000 colony-forming's per milliliter resistant to nitrofurantoin.    6/10/2024 Klebsiella pneumoniae 50,000 colony-forming units per milliliter resistant to ampicillin, levofloxacin, intermediate susceptibility to nitrofurantoin, ampicillin/sulbactam.    5/9/2024 Klebsiella pneumonia greater than 100,000 colony-forming's per milliliter resistant to ampicillin, levofloxacin, intermediate susceptibility to nitrofurantoin, ampicillin/sulbactam    4/12/2024 Klebsiella pneumoniae greater than 100,000 coliform units per milliliter resistant to ampicillin, cefoxitin, intermediate susceptibility to nitrofurantoin.    2/20/2024 Klebsiella pneumoniae greater than 100,000 colony-forming's per milliliter resistant to ampicillin, levofloxacin, intermediate susceptibility nitrofurantoin    Objective     Past Medical History:   Diagnosis Date    Allergies     Heart murmur     Hepatitis     HEP B    Hypothyroidism        Past Surgical History:   Procedure Laterality Date    CHOLECYSTECTOMY      EYE SURGERY      PACEMAKER IMPLANTATION  12/01/2018    SUBTOTAL HYSTERECTOMY           Current Outpatient Medications:     albuterol (PROVENTIL) (2.5 MG/3ML) 0.083% nebulizer solution, Inhale 2.5  "mg., Disp: , Rfl:     aspirin 81 MG chewable tablet, Chew 1 tablet Daily., Disp: , Rfl:     carvedilol (COREG) 12.5 MG tablet, TAKE 1 TABLET BY MOUTH TWICE A DAY, Disp: 180 tablet, Rfl: 3    clobetasol (TEMOVATE) 0.05 % ointment, Apply 1 Application topically to the appropriate area as directed Every Night., Disp: 30 g, Rfl: 2    dilTIAZem CD (CARDIZEM CD) 180 MG 24 hr capsule, Take 1 capsule by mouth Daily., Disp: 90 capsule, Rfl: 3    Elderberry 500 MG capsule, Take 1 capsule by mouth Daily., Disp: , Rfl:     erythromycin (ROMYCIN) 5 MG/GM ophthalmic ointment, PLACE INTO BOTH EYES, APPLY 1/4 INCH TO AFFECTED EYE AT BEDTIME., Disp: , Rfl:     levothyroxine (SYNTHROID, LEVOTHROID) 175 MCG tablet, TAKE 1 TABLET BY MOUTH EVERY DAY, Disp: 90 tablet, Rfl: 1    rivaroxaban (Xarelto) 15 MG tablet, Take 1 tablet by mouth Daily., Disp: 90 tablet, Rfl: 1    Allergies   Allergen Reactions    Ct Contrast Unknown - High Severity     States she can't remember    Atorvastatin Unknown - Low Severity    Iodinated Contrast Media Unknown - Low Severity    Lovastatin Unknown - Low Severity     MUSCLE ACHES    Penicillins Unknown - Low Severity    Rosuvastatin Calcium Unknown - Low Severity    Simvastatin Unknown - Low Severity    Sulfa Antibiotics Unknown - Low Severity    Latex Unknown - Low Severity        History reviewed. No pertinent family history.    Social History     Socioeconomic History    Marital status:    Tobacco Use    Smoking status: Never     Passive exposure: Past    Smokeless tobacco: Never   Vaping Use    Vaping status: Never Used   Substance and Sexual Activity    Alcohol use: Never    Drug use: Never    Sexual activity: Not Currently       Vital Signs:   Resp 16   Ht 157.5 cm (62\")   Wt 75.3 kg (166 lb)   BMI 30.36 kg/m²      Physical Exam     Result Review :   The following data was reviewed by: IGOR Reynaga on 05/05/2025:  Results for orders placed or performed in visit on 05/05/25   Bladder " Scan    Collection Time: 05/05/25 11:07 AM   Result Value Ref Range    Urine Volume 0    POC Urinalysis Dipstick, Automated    Collection Time: 05/05/25 11:09 AM    Specimen: Urine   Result Value Ref Range    Color Yellow Yellow, Straw, Dark Yellow, Mirna    Clarity, UA Slightly Cloudy (A) Clear    Specific Gravity  1.020 1.005 - 1.030    pH, Urine 5.5 5.0 - 8.0    Leukocytes Trace (A) Negative    Nitrite, UA Negative Negative    Protein, POC Negative Negative mg/dL    Glucose, UA Negative Negative mg/dL    Ketones, UA Negative Negative    Urobilinogen, UA 0.2 E.U./dL Normal, 0.2 E.U./dL    Bilirubin Negative Negative    Blood, UA Negative Negative    Lot Number [id number]     Expiration Date 122,025       Bladder Scan interpretation 05/05/2025    Estimation of residual urine via BVI 3000 Verathon Bladder Scan  MA/nurse performing: jace mak Ma    Residual Urine: 0 ml  Indication: Urgency of urination    Urge incontinence   Position: Supine  Examination: Incremental scanning of the suprapubic area using 2.0 MHz transducer using copious amounts of acoustic gel.   Findings: An anechoic area was demonstrated which represented the bladder, with measurement of residual urine as noted. I inspected this myself. In that the residual urine was stable or insignificant, refer to plan for treatment and plan necessary at this time.            Procedures        Assessment and Plan    Diagnoses and all orders for this visit:    1. Urgency of urination (Primary)  -     POC Urinalysis Dipstick, Automated  -     Bladder Scan    2. Urge incontinence  -     POC Urinalysis Dipstick, Automated  -     Bladder Scan        She is dong well today.    We dicussed that our options for treatment of her leakage are limited. Traditional oab Medications can cause side effects of dry mouth, dry eyes and constipation as well as global cognitive decline and are not safe and the newer safer drugs for oab are often expensive and not covered by insurance  formulary.    We discussed that our goal for treatment would not be to completely eliminate the use of pads but to mitigate symptoms to make her more comfortable.    The patient states that she is okay with how her symptoms are to date.    No significant issues.     She will continue to taper clobetasol.    She will call with any s/s of UTI for an outpatient cx.  Did discuss once again that we would only recommend treating a urinary tract infection that is culture positive when she is experiencing true symptoms of a urinary tract infection otherwise we should not be checking her urinalysis or culture.        I spent 10 minutes caring for Melody on this date of service. This time includes time spent by me in the following activities:reviewing tests, obtaining and/or reviewing a separately obtained history, performing a medically appropriate examination and/or evaluation , counseling and educating the patient/family/caregiver, ordering medications, tests, or procedures, and documenting information in the medical record  Follow Up   Return in about 6 months (around 11/5/2025) for f/u UTIs .  Patient was given instructions and counseling regarding her condition or for health maintenance advice. Please see specific information pulled into the AVS if appropriate.         This document has been electronically signed by Ivette Jaime, IGOR  May 5, 2025 11:40 EDT

## 2025-06-04 ENCOUNTER — OFFICE VISIT (OUTPATIENT)
Dept: FAMILY MEDICINE CLINIC | Facility: CLINIC | Age: OVER 89
End: 2025-06-04
Payer: MEDICARE

## 2025-06-04 VITALS
TEMPERATURE: 98.2 F | HEART RATE: 83 BPM | OXYGEN SATURATION: 96 % | SYSTOLIC BLOOD PRESSURE: 108 MMHG | DIASTOLIC BLOOD PRESSURE: 70 MMHG | WEIGHT: 163.6 LBS | HEIGHT: 62 IN | BODY MASS INDEX: 30.11 KG/M2

## 2025-06-04 DIAGNOSIS — I48.21 PERMANENT ATRIAL FIBRILLATION: ICD-10-CM

## 2025-06-04 DIAGNOSIS — Z76.89 ESTABLISHING CARE WITH NEW DOCTOR, ENCOUNTER FOR: Primary | ICD-10-CM

## 2025-06-04 DIAGNOSIS — I10 PRIMARY HYPERTENSION: ICD-10-CM

## 2025-06-04 DIAGNOSIS — R53.83 TIREDNESS: ICD-10-CM

## 2025-06-04 DIAGNOSIS — Z78.0 POSTMENOPAUSE: ICD-10-CM

## 2025-06-04 DIAGNOSIS — Z95.0 PACEMAKER: ICD-10-CM

## 2025-06-04 NOTE — PROGRESS NOTES
"Chief Complaint  Establish Care    Subjective      Melody Astudillo is a 89 y.o. female who presents to [facility]     History of Present Illness  The patient is a 89-year-old female who presents to [facility].    She reports experiencing fatigue, which she attributes to her advanced age. She has been on thyroid medication since 1957, with a current dosage of 175 mcg     She reports no urinary symptoms such as pain, burning, or lower abdominal or back pain.    She has lichen sclerosus uses clobetasol, doing great, itching decreased and irritation is better.  Follows with OB/GYN regularly.    She has a pacemaker and is on Cardizem, Coreg, Xarelto and aspirin 81 mg, which she does not take daily. . She reports dyspnea on exertion, which she has become accustomed to.       Patient Care Team:  Citlaly Schaeffer MD as PCP - General (Family Medicine)  Ivette Jaime APRN as Nurse Practitioner (Urology)    Review of Systems      Objective   Vital Signs:   Vitals:    06/04/25 1046   BP: 108/70   BP Location: Left arm   Patient Position: Sitting   Cuff Size: Adult   Pulse: 83   Temp: 98.2 °F (36.8 °C)   TempSrc: Temporal   SpO2: 96%   Weight: 74.2 kg (163 lb 9.6 oz)   Height: 157.5 cm (62.01\")     Body mass index is 29.92 kg/m².    Wt Readings from Last 3 Encounters:   06/04/25 74.2 kg (163 lb 9.6 oz)   05/05/25 75.3 kg (166 lb)   04/15/25 75.3 kg (166 lb)     BP Readings from Last 3 Encounters:   06/04/25 108/70   04/15/25 110/70   03/18/25 112/70       Health Maintenance   Topic Date Due    DXA SCAN  Never done    Pneumococcal Vaccine 50+ (1 of 2 - PCV) Never done    COVID-19 Vaccine (1 - 2024-25 season) 06/18/2025 (Originally 9/1/2024)    TDAP/TD VACCINES (1 - Tdap) 06/18/2025 (Originally 11/29/1953)    ZOSTER VACCINE (1 of 2) 06/18/2025 (Originally 11/29/1984)    RSV Vaccine - Adults (1 - 1-dose 75+ series) 06/04/2026 (Originally 11/29/2009)    INFLUENZA VACCINE  07/01/2025 "    ANNUAL WELLNESS VISIT  12/04/2025    LIPID PANEL  05/05/2026       Physical Exam  Constitutional:       Appearance: Normal appearance.   HENT:      Head: Normocephalic and atraumatic.      Right Ear: Tympanic membrane normal.      Left Ear: Tympanic membrane normal.      Mouth/Throat:      Mouth: Mucous membranes are moist.   Eyes:      Pupils: Pupils are equal, round, and reactive to light.   Cardiovascular:      Rate and Rhythm: Normal rate. Rhythm irregular.      Pulses: Normal pulses.      Heart sounds: Normal heart sounds.   Pulmonary:      Effort: Pulmonary effort is normal.      Breath sounds: Normal breath sounds. No wheezing.   Abdominal:      Palpations: Abdomen is soft.      Tenderness: There is no abdominal tenderness.      Hernia: No hernia is present.   Musculoskeletal:         General: No swelling or tenderness.   Skin:     General: Skin is warm.   Neurological:      General: No focal deficit present.      Mental Status: She is alert and oriented to person, place, and time.   Psychiatric:         Mood and Affect: Mood normal.         Behavior: Behavior normal.         Physical Exam  Respiratory: Clear to auscultation, no wheezing, rales or rhonchi  Cardiovascular: Regular rate and rhythm, slight irregularity noted, no murmurs, rubs, or gallops  Extremities: No edema, no cyanosis       Result Review   The following data was reviewed by: Citlaly Schaeffer MD on 06/04/2025:  [x]  Tests & Results  []  Hospitalization/Emergency Department/Urgent Care  []  Internal/External Consultant Notes      Results  Labs   - Creatinine: 1.02, 1.03   - Kidney function: Improved from 44% to 51%   - Thyroid levels: Within normal range   - Hemoglobin: 13.6   - Cholesterol levels: Slightly elevated       ASSESSMENT/PLAN  Diagnoses and all orders for this visit:    1. Establishing care with new doctor, encounter for (Primary)    2. Tiredness    3. Postmenopause  -     DEXA Bone Density Axial; Future    4. Primary  hypertension    5. Permanent atrial fibrillation    6. Pacemaker      Assessment & Plan  1. Fatigue.  - Reviewed labs overall look good.  Kidney function and liver function are within normal range.  - Thyroid levels are within the normal range, and hemoglobin has risen from 10.2 to 13.6.   - Blood pressure readings are within the normal range.   - Advised to take a daily supplement of vitamin D with calcium and a multivitamin . If symptoms persist after taking the daily supplement, further workup will be considered.    4. Pacemaker Management.  - Has a pacemaker and is currently taking Cardizem and Coreg (carvedilol).  - Also on aspirin 81 mg and Xarelto.  - Advised to continue these medications as prescribed by her cardiologist     5. Health Maintenance.  - Continue drinking Ensure with protein daily to help maintain muscle mass.  - Strength training exercises using elastic bands or weights are recommended to keep muscles strong.  - Encouraged to stay as active as possible to maintain overall health.    Follow-up  The patient will follow up in 3 months.       BMI is >= 25 and <30. (Overweight) The following options were offered after discussion;: exercise counseling/recommendations and nutrition counseling/recommendations       Melody Dimas Self  reports that she has never smoked. She has been exposed to tobacco smoke. She has never used smokeless tobacco.          FOLLOW UP  Return in about 3 months (around 9/4/2025).  Patient was given instructions and counseling regarding her condition or for health maintenance advice. Please see specific information pulled into the AVS if appropriate.       Citlaly Schaeffer MD  06/04/25  12:51 EDT    Patient or patient representative verbalized consent for the use of Ambient Listening during the visit with  Citlaly Schaeffer MD for chart documentation. 6/4/2025  12:51 EDT

## 2025-07-01 ENCOUNTER — HOSPITAL ENCOUNTER (OUTPATIENT)
Dept: BONE DENSITY | Facility: HOSPITAL | Age: OVER 89
Discharge: HOME | End: 2025-07-01
Admitting: STUDENT IN AN ORGANIZED HEALTH CARE EDUCATION/TRAINING PROGRAM
Payer: MEDICARE

## 2025-07-01 DIAGNOSIS — Z78.0 POSTMENOPAUSE: ICD-10-CM

## 2025-07-01 PROCEDURE — 77080 DXA BONE DENSITY AXIAL: CPT
